# Patient Record
Sex: MALE | Race: ASIAN | NOT HISPANIC OR LATINO | Employment: OTHER | URBAN - METROPOLITAN AREA
[De-identification: names, ages, dates, MRNs, and addresses within clinical notes are randomized per-mention and may not be internally consistent; named-entity substitution may affect disease eponyms.]

---

## 2024-03-22 ENCOUNTER — HOSPITAL ENCOUNTER (INPATIENT)
Facility: HOSPITAL | Age: 64
LOS: 2 days | Discharge: HOME | DRG: 322 | End: 2024-03-24
Attending: STUDENT IN AN ORGANIZED HEALTH CARE EDUCATION/TRAINING PROGRAM | Admitting: STUDENT IN AN ORGANIZED HEALTH CARE EDUCATION/TRAINING PROGRAM

## 2024-03-22 DIAGNOSIS — I21.4 NSTEMI (NON-ST ELEVATED MYOCARDIAL INFARCTION): Primary | ICD-10-CM

## 2024-03-22 DIAGNOSIS — R07.9 CHEST PAIN: ICD-10-CM

## 2024-03-22 DIAGNOSIS — I25.10 CAD (CORONARY ARTERY DISEASE): ICD-10-CM

## 2024-03-22 DIAGNOSIS — R79.89 TROPONIN LEVEL ELEVATED: ICD-10-CM

## 2024-03-22 PROBLEM — F17.200 SMOKING: Status: ACTIVE | Noted: 2024-03-22

## 2024-03-22 LAB
ALBUMIN SERPL BCP-MCNC: 4.2 G/DL (ref 3.5–5.2)
ALP SERPL-CCNC: 62 U/L (ref 55–135)
ALT SERPL W/O P-5'-P-CCNC: 23 U/L (ref 10–44)
ANION GAP SERPL CALC-SCNC: 8 MMOL/L (ref 8–16)
APTT PPP: 27.4 SEC (ref 21–32)
AST SERPL-CCNC: 65 U/L (ref 10–40)
BASOPHILS # BLD AUTO: 0.03 K/UL (ref 0–0.2)
BASOPHILS NFR BLD: 0.4 % (ref 0–1.9)
BILIRUB SERPL-MCNC: 0.6 MG/DL (ref 0.1–1)
BNP SERPL-MCNC: 48 PG/ML (ref 0–99)
BUN SERPL-MCNC: 8 MG/DL (ref 8–23)
CALCIUM SERPL-MCNC: 9.1 MG/DL (ref 8.7–10.5)
CHLORIDE SERPL-SCNC: 108 MMOL/L (ref 95–110)
CO2 SERPL-SCNC: 22 MMOL/L (ref 23–29)
CREAT SERPL-MCNC: 0.9 MG/DL (ref 0.5–1.4)
DIFFERENTIAL METHOD BLD: ABNORMAL
EOSINOPHIL # BLD AUTO: 0.1 K/UL (ref 0–0.5)
EOSINOPHIL NFR BLD: 0.8 % (ref 0–8)
ERYTHROCYTE [DISTWIDTH] IN BLOOD BY AUTOMATED COUNT: 13.1 % (ref 11.5–14.5)
EST. GFR  (NO RACE VARIABLE): >60 ML/MIN/1.73 M^2
GLUCOSE SERPL-MCNC: 114 MG/DL (ref 70–110)
HCT VFR BLD AUTO: 45.5 % (ref 40–54)
HGB BLD-MCNC: 15 G/DL (ref 14–18)
IMM GRANULOCYTES # BLD AUTO: 0.03 K/UL (ref 0–0.04)
IMM GRANULOCYTES NFR BLD AUTO: 0.4 % (ref 0–0.5)
INR PPP: 1 (ref 0.8–1.2)
LYMPHOCYTES # BLD AUTO: 1.5 K/UL (ref 1–4.8)
LYMPHOCYTES NFR BLD: 19.2 % (ref 18–48)
MCH RBC QN AUTO: 30.2 PG (ref 27–31)
MCHC RBC AUTO-ENTMCNC: 33 G/DL (ref 32–36)
MCV RBC AUTO: 92 FL (ref 82–98)
MONOCYTES # BLD AUTO: 0.5 K/UL (ref 0.3–1)
MONOCYTES NFR BLD: 5.9 % (ref 4–15)
NEUTROPHILS # BLD AUTO: 5.8 K/UL (ref 1.8–7.7)
NEUTROPHILS NFR BLD: 73.3 % (ref 38–73)
NRBC BLD-RTO: 0 /100 WBC
PLATELET # BLD AUTO: 208 K/UL (ref 150–450)
PMV BLD AUTO: 9.5 FL (ref 9.2–12.9)
POTASSIUM SERPL-SCNC: 4.1 MMOL/L (ref 3.5–5.1)
PROT SERPL-MCNC: 7.4 G/DL (ref 6–8.4)
PROTHROMBIN TIME: 11 SEC (ref 9–12.5)
RBC # BLD AUTO: 4.96 M/UL (ref 4.6–6.2)
SODIUM SERPL-SCNC: 138 MMOL/L (ref 136–145)
TROPONIN I SERPL DL<=0.01 NG/ML-MCNC: 1.47 NG/ML (ref 0–0.03)
TROPONIN I SERPL DL<=0.01 NG/ML-MCNC: 5.21 NG/ML (ref 0–0.03)
WBC # BLD AUTO: 7.95 K/UL (ref 3.9–12.7)

## 2024-03-22 PROCEDURE — 25000003 PHARM REV CODE 250: Performed by: STUDENT IN AN ORGANIZED HEALTH CARE EDUCATION/TRAINING PROGRAM

## 2024-03-22 PROCEDURE — 11000001 HC ACUTE MED/SURG PRIVATE ROOM

## 2024-03-22 PROCEDURE — 84484 ASSAY OF TROPONIN QUANT: CPT | Mod: 91 | Performed by: STUDENT IN AN ORGANIZED HEALTH CARE EDUCATION/TRAINING PROGRAM

## 2024-03-22 PROCEDURE — 85025 COMPLETE CBC W/AUTO DIFF WBC: CPT | Performed by: NURSE PRACTITIONER

## 2024-03-22 PROCEDURE — 84484 ASSAY OF TROPONIN QUANT: CPT | Performed by: NURSE PRACTITIONER

## 2024-03-22 PROCEDURE — 99285 EMERGENCY DEPT VISIT HI MDM: CPT | Mod: 25

## 2024-03-22 PROCEDURE — 93005 ELECTROCARDIOGRAM TRACING: CPT

## 2024-03-22 PROCEDURE — 36415 COLL VENOUS BLD VENIPUNCTURE: CPT | Performed by: STUDENT IN AN ORGANIZED HEALTH CARE EDUCATION/TRAINING PROGRAM

## 2024-03-22 PROCEDURE — 21400001 HC TELEMETRY ROOM

## 2024-03-22 PROCEDURE — 93010 ELECTROCARDIOGRAM REPORT: CPT | Mod: ,,, | Performed by: INTERNAL MEDICINE

## 2024-03-22 PROCEDURE — 85610 PROTHROMBIN TIME: CPT | Performed by: STUDENT IN AN ORGANIZED HEALTH CARE EDUCATION/TRAINING PROGRAM

## 2024-03-22 PROCEDURE — 85730 THROMBOPLASTIN TIME PARTIAL: CPT | Performed by: STUDENT IN AN ORGANIZED HEALTH CARE EDUCATION/TRAINING PROGRAM

## 2024-03-22 PROCEDURE — 83880 ASSAY OF NATRIURETIC PEPTIDE: CPT | Performed by: NURSE PRACTITIONER

## 2024-03-22 PROCEDURE — 63600175 PHARM REV CODE 636 W HCPCS: Performed by: STUDENT IN AN ORGANIZED HEALTH CARE EDUCATION/TRAINING PROGRAM

## 2024-03-22 PROCEDURE — 80053 COMPREHEN METABOLIC PANEL: CPT | Performed by: NURSE PRACTITIONER

## 2024-03-22 RX ORDER — SODIUM CHLORIDE 0.9 % (FLUSH) 0.9 %
10 SYRINGE (ML) INJECTION EVERY 12 HOURS PRN
Status: DISCONTINUED | OUTPATIENT
Start: 2024-03-22 | End: 2024-03-24 | Stop reason: HOSPADM

## 2024-03-22 RX ORDER — TALC
6 POWDER (GRAM) TOPICAL NIGHTLY PRN
Status: DISCONTINUED | OUTPATIENT
Start: 2024-03-22 | End: 2024-03-24 | Stop reason: HOSPADM

## 2024-03-22 RX ORDER — POLYETHYLENE GLYCOL 3350 17 G/17G
17 POWDER, FOR SOLUTION ORAL DAILY
Status: DISCONTINUED | OUTPATIENT
Start: 2024-03-23 | End: 2024-03-24 | Stop reason: HOSPADM

## 2024-03-22 RX ORDER — NAPROXEN SODIUM 220 MG/1
81 TABLET, FILM COATED ORAL DAILY
Status: DISCONTINUED | OUTPATIENT
Start: 2024-03-23 | End: 2024-03-24 | Stop reason: HOSPADM

## 2024-03-22 RX ORDER — ACETAMINOPHEN 325 MG/1
650 TABLET ORAL EVERY 8 HOURS PRN
Status: DISCONTINUED | OUTPATIENT
Start: 2024-03-22 | End: 2024-03-24 | Stop reason: HOSPADM

## 2024-03-22 RX ORDER — SODIUM,POTASSIUM PHOSPHATES 280-250MG
2 POWDER IN PACKET (EA) ORAL
Status: DISCONTINUED | OUTPATIENT
Start: 2024-03-22 | End: 2024-03-24 | Stop reason: HOSPADM

## 2024-03-22 RX ORDER — IBUPROFEN 200 MG
16 TABLET ORAL
Status: DISCONTINUED | OUTPATIENT
Start: 2024-03-22 | End: 2024-03-24 | Stop reason: HOSPADM

## 2024-03-22 RX ORDER — NALOXONE HCL 0.4 MG/ML
0.02 VIAL (ML) INJECTION
Status: DISCONTINUED | OUTPATIENT
Start: 2024-03-22 | End: 2024-03-24 | Stop reason: HOSPADM

## 2024-03-22 RX ORDER — CLOPIDOGREL BISULFATE 75 MG/1
75 TABLET ORAL DAILY
Status: DISCONTINUED | OUTPATIENT
Start: 2024-03-23 | End: 2024-03-24 | Stop reason: HOSPADM

## 2024-03-22 RX ORDER — ONDANSETRON HYDROCHLORIDE 2 MG/ML
4 INJECTION, SOLUTION INTRAVENOUS EVERY 8 HOURS PRN
Status: DISCONTINUED | OUTPATIENT
Start: 2024-03-22 | End: 2024-03-23 | Stop reason: SDUPTHER

## 2024-03-22 RX ORDER — LANOLIN ALCOHOL/MO/W.PET/CERES
800 CREAM (GRAM) TOPICAL
Status: DISCONTINUED | OUTPATIENT
Start: 2024-03-22 | End: 2024-03-24 | Stop reason: HOSPADM

## 2024-03-22 RX ORDER — GLUCAGON 1 MG
1 KIT INJECTION
Status: DISCONTINUED | OUTPATIENT
Start: 2024-03-22 | End: 2024-03-24 | Stop reason: HOSPADM

## 2024-03-22 RX ORDER — SIMETHICONE 80 MG
1 TABLET,CHEWABLE ORAL 4 TIMES DAILY PRN
Status: DISCONTINUED | OUTPATIENT
Start: 2024-03-22 | End: 2024-03-24 | Stop reason: HOSPADM

## 2024-03-22 RX ORDER — IPRATROPIUM BROMIDE AND ALBUTEROL SULFATE 2.5; .5 MG/3ML; MG/3ML
3 SOLUTION RESPIRATORY (INHALATION) EVERY 4 HOURS PRN
Status: DISCONTINUED | OUTPATIENT
Start: 2024-03-22 | End: 2024-03-24 | Stop reason: HOSPADM

## 2024-03-22 RX ORDER — ASPIRIN 325 MG
325 TABLET ORAL
Status: COMPLETED | OUTPATIENT
Start: 2024-03-22 | End: 2024-03-22

## 2024-03-22 RX ORDER — CLOPIDOGREL BISULFATE 75 MG/1
300 TABLET ORAL ONCE
Status: COMPLETED | OUTPATIENT
Start: 2024-03-22 | End: 2024-03-22

## 2024-03-22 RX ORDER — BISACODYL 10 MG/1
10 SUPPOSITORY RECTAL DAILY PRN
Status: DISCONTINUED | OUTPATIENT
Start: 2024-03-22 | End: 2024-03-24 | Stop reason: HOSPADM

## 2024-03-22 RX ORDER — PROCHLORPERAZINE EDISYLATE 5 MG/ML
5 INJECTION INTRAMUSCULAR; INTRAVENOUS EVERY 6 HOURS PRN
Status: DISCONTINUED | OUTPATIENT
Start: 2024-03-22 | End: 2024-03-24 | Stop reason: HOSPADM

## 2024-03-22 RX ORDER — HEPARIN SODIUM,PORCINE/D5W 25000/250
0-40 INTRAVENOUS SOLUTION INTRAVENOUS CONTINUOUS
Status: DISCONTINUED | OUTPATIENT
Start: 2024-03-22 | End: 2024-03-23

## 2024-03-22 RX ORDER — ATORVASTATIN CALCIUM 40 MG/1
40 TABLET, FILM COATED ORAL NIGHTLY
Status: DISCONTINUED | OUTPATIENT
Start: 2024-03-22 | End: 2024-03-24 | Stop reason: HOSPADM

## 2024-03-22 RX ORDER — ACETAMINOPHEN 325 MG/1
650 TABLET ORAL EVERY 4 HOURS PRN
Status: DISCONTINUED | OUTPATIENT
Start: 2024-03-22 | End: 2024-03-24 | Stop reason: HOSPADM

## 2024-03-22 RX ORDER — IBUPROFEN 200 MG
24 TABLET ORAL
Status: DISCONTINUED | OUTPATIENT
Start: 2024-03-22 | End: 2024-03-24 | Stop reason: HOSPADM

## 2024-03-22 RX ORDER — ALUMINUM HYDROXIDE, MAGNESIUM HYDROXIDE, AND SIMETHICONE 1200; 120; 1200 MG/30ML; MG/30ML; MG/30ML
30 SUSPENSION ORAL 4 TIMES DAILY PRN
Status: DISCONTINUED | OUTPATIENT
Start: 2024-03-22 | End: 2024-03-24 | Stop reason: HOSPADM

## 2024-03-22 RX ADMIN — ASPIRIN 325 MG ORAL TABLET 325 MG: 325 PILL ORAL at 07:03

## 2024-03-22 RX ADMIN — ATORVASTATIN CALCIUM 40 MG: 40 TABLET, FILM COATED ORAL at 10:03

## 2024-03-22 RX ADMIN — HEPARIN SODIUM 12 UNITS/KG/HR: 10000 INJECTION, SOLUTION INTRAVENOUS at 09:03

## 2024-03-22 RX ADMIN — CLOPIDOGREL BISULFATE 300 MG: 75 TABLET ORAL at 10:03

## 2024-03-22 NOTE — Clinical Note
The DP pulses were 2+ bilaterally. The PT pulses were 1+ bilaterally. The right radial pulse was +2.

## 2024-03-22 NOTE — FIRST PROVIDER EVALUATION
"Medical screening examination initiated.  I have conducted a focused provider triage encounter, findings are as follows:    Brief history of present illness:  CP, left sided.    Vitals:    03/22/24 1855   BP: 102/66   Pulse: 63   Resp: 18   Temp: 98.9 °F (37.2 °C)   TempSrc: Oral   SpO2: 98%   Weight: 69.9 kg (154 lb)   Height: 5' 5" (1.651 m)       Pertinent physical exam:  AAO no distress.    Brief workup plan:  EKG, CXR, Labs    Preliminary workup initiated; this workup will be continued and followed by the physician or advanced practice provider that is assigned to the patient when roomed.  "

## 2024-03-22 NOTE — LETTER
March 24, 2024       [address]  [facility] [place] [address]       Patient: Jt Avila   YOB: 1960    To Whom It May Concern:    Leif Avila  was admitted to [facility] on 03/22/2024. The patient may return to work on 03/25/2024 with no restrictions.     Sincerely,    Catina Potts RN

## 2024-03-22 NOTE — Clinical Note
The catheter was inserted into the and was removed from the proximal   circumflex. IVUS Performed (pre-stent)

## 2024-03-22 NOTE — Clinical Note
The catheter was inserted into the and was removed from the proximal   circumflex. IVUS Performed (post-stent)

## 2024-03-22 NOTE — Clinical Note
The catheter was inserted into the and was inserted over the wire into the ostium   left main. An angiography was performed of the left coronary arteries. The angiography was performed via power injection.

## 2024-03-23 LAB
ABO + RH BLD: NORMAL
ANION GAP SERPL CALC-SCNC: 9 MMOL/L (ref 8–16)
APTT PPP: 47.4 SEC (ref 21–32)
APTT PPP: 52.1 SEC (ref 21–32)
ASCENDING AORTA: 3.4 CM
AV INDEX (PROSTH): 0.81
AV MEAN GRADIENT: 3 MMHG
AV PEAK GRADIENT: 5 MMHG
AV VALVE AREA BY VELOCITY RATIO: 2.36 CM²
AV VALVE AREA: 2.53 CM²
AV VELOCITY RATIO: 0.76
BASOPHILS # BLD AUTO: 0.03 K/UL (ref 0–0.2)
BASOPHILS NFR BLD: 0.3 % (ref 0–1.9)
BLD GP AB SCN CELLS X3 SERPL QL: NORMAL
BSA FOR ECHO PROCEDURE: 1.79 M2
BUN SERPL-MCNC: 8 MG/DL (ref 8–23)
CALCIUM SERPL-MCNC: 8.5 MG/DL (ref 8.7–10.5)
CHLORIDE SERPL-SCNC: 108 MMOL/L (ref 95–110)
CHOLEST SERPL-MCNC: 210 MG/DL (ref 120–199)
CHOLEST/HDLC SERPL: 5.4 {RATIO} (ref 2–5)
CO2 SERPL-SCNC: 22 MMOL/L (ref 23–29)
CREAT SERPL-MCNC: 0.8 MG/DL (ref 0.5–1.4)
CV ECHO LV RWT: 0.53 CM
DIFFERENTIAL METHOD BLD: NORMAL
DOP CALC AO PEAK VEL: 1.17 M/S
DOP CALC AO VTI: 24.1 CM
DOP CALC LVOT AREA: 3.1 CM2
DOP CALC LVOT DIAMETER: 1.99 CM
DOP CALC LVOT PEAK VEL: 0.89 M/S
DOP CALC LVOT STROKE VOLUME: 60.93 CM3
DOP CALC MV VTI: 31.2 CM
DOP CALCLVOT PEAK VEL VTI: 19.6 CM
E WAVE DECELERATION TIME: 252.84 MSEC
E/A RATIO: 0.94
E/E' RATIO: 14 M/S
ECHO LV POSTERIOR WALL: 1.16 CM (ref 0.6–1.1)
EOSINOPHIL # BLD AUTO: 0.1 K/UL (ref 0–0.5)
EOSINOPHIL NFR BLD: 1.3 % (ref 0–8)
ERYTHROCYTE [DISTWIDTH] IN BLOOD BY AUTOMATED COUNT: 13 % (ref 11.5–14.5)
EST. GFR  (NO RACE VARIABLE): >60 ML/MIN/1.73 M^2
ESTIMATED AVG GLUCOSE: 108 MG/DL (ref 68–131)
FRACTIONAL SHORTENING: 28 % (ref 28–44)
GLUCOSE SERPL-MCNC: 108 MG/DL (ref 70–110)
HBA1C MFR BLD: 5.4 % (ref 4–5.6)
HCT VFR BLD AUTO: 43.1 % (ref 40–54)
HDLC SERPL-MCNC: 39 MG/DL (ref 40–75)
HDLC SERPL: 18.6 % (ref 20–50)
HGB BLD-MCNC: 14.5 G/DL (ref 14–18)
IMM GRANULOCYTES # BLD AUTO: 0.03 K/UL (ref 0–0.04)
IMM GRANULOCYTES NFR BLD AUTO: 0.3 % (ref 0–0.5)
INR PPP: 1 (ref 0.8–1.2)
INTERVENTRICULAR SEPTUM: 1.41 CM (ref 0.6–1.1)
IVC DIAMETER: 1.45 CM
IVRT: 102.76 MSEC
LA MAJOR: 5.48 CM
LA MINOR: 5.26 CM
LA WIDTH: 3.7 CM
LDLC SERPL CALC-MCNC: 155.4 MG/DL (ref 63–159)
LEFT ATRIUM SIZE: 3.44 CM
LEFT ATRIUM VOLUME INDEX: 33.2 ML/M2
LEFT ATRIUM VOLUME: 58.07 CM3
LEFT INTERNAL DIMENSION IN SYSTOLE: 3.12 CM (ref 2.1–4)
LEFT VENTRICLE DIASTOLIC VOLUME INDEX: 48.75 ML/M2
LEFT VENTRICLE DIASTOLIC VOLUME: 85.32 ML
LEFT VENTRICLE MASS INDEX: 119 G/M2
LEFT VENTRICLE SYSTOLIC VOLUME INDEX: 22.1 ML/M2
LEFT VENTRICLE SYSTOLIC VOLUME: 38.64 ML
LEFT VENTRICULAR INTERNAL DIMENSION IN DIASTOLE: 4.35 CM (ref 3.5–6)
LEFT VENTRICULAR MASS: 207.82 G
LV LATERAL E/E' RATIO: 12.83 M/S
LV SEPTAL E/E' RATIO: 15.4 M/S
LVOT MG: 1.65 MMHG
LVOT MV: 0.6 CM/S
LYMPHOCYTES # BLD AUTO: 2 K/UL (ref 1–4.8)
LYMPHOCYTES NFR BLD: 18.8 % (ref 18–48)
MAGNESIUM SERPL-MCNC: 2.2 MG/DL (ref 1.6–2.6)
MCH RBC QN AUTO: 30.7 PG (ref 27–31)
MCHC RBC AUTO-ENTMCNC: 33.6 G/DL (ref 32–36)
MCV RBC AUTO: 91 FL (ref 82–98)
MONOCYTES # BLD AUTO: 0.8 K/UL (ref 0.3–1)
MONOCYTES NFR BLD: 7.9 % (ref 4–15)
MV MEAN GRADIENT: 1 MMHG
MV PEAK A VEL: 0.82 M/S
MV PEAK E VEL: 0.77 M/S
MV PEAK GRADIENT: 4 MMHG
MV STENOSIS PRESSURE HALF TIME: 73.32 MS
MV VALVE AREA BY CONTINUITY EQUATION: 1.95 CM2
MV VALVE AREA P 1/2 METHOD: 3 CM2
NEUTROPHILS # BLD AUTO: 7.4 K/UL (ref 1.8–7.7)
NEUTROPHILS NFR BLD: 71.4 % (ref 38–73)
NONHDLC SERPL-MCNC: 171 MG/DL
NRBC BLD-RTO: 0 /100 WBC
PHOSPHATE SERPL-MCNC: 2.5 MG/DL (ref 2.7–4.5)
PISA TR MAX VEL: 2.35 M/S
PLATELET # BLD AUTO: 210 K/UL (ref 150–450)
PMV BLD AUTO: 9.4 FL (ref 9.2–12.9)
POC ACTIVATED CLOTTING TIME K: 163 SEC (ref 74–137)
POC ACTIVATED CLOTTING TIME K: 266 SEC (ref 74–137)
POC ACTIVATED CLOTTING TIME K: 266 SEC (ref 74–137)
POCT GLUCOSE: 82 MG/DL (ref 70–110)
POTASSIUM SERPL-SCNC: 3.5 MMOL/L (ref 3.5–5.1)
PROTHROMBIN TIME: 11.1 SEC (ref 9–12.5)
PV PEAK GRADIENT: 3 MMHG
PV PEAK VELOCITY: 0.8 M/S
RA MAJOR: 4.99 CM
RA PRESSURE ESTIMATED: 3 MMHG
RA WIDTH: 3.5 CM
RBC # BLD AUTO: 4.73 M/UL (ref 4.6–6.2)
RIGHT VENTRICULAR END-DIASTOLIC DIMENSION: 3.7 CM
RV TB RVSP: 5 MMHG
RV TISSUE DOPPLER FREE WALL SYSTOLIC VELOCITY 1 (APICAL 4 CHAMBER VIEW): 12.74 CM/S
SAMPLE: ABNORMAL
SINUS: 3.65 CM
SODIUM SERPL-SCNC: 139 MMOL/L (ref 136–145)
SPECIMEN OUTDATE: NORMAL
STJ: 2.56 CM
TDI LATERAL: 0.06 M/S
TDI SEPTAL: 0.05 M/S
TDI: 0.06 M/S
TR MAX PG: 22 MMHG
TRICUSPID ANNULAR PLANE SYSTOLIC EXCURSION: 1.64 CM
TRIGL SERPL-MCNC: 78 MG/DL (ref 30–150)
TROPONIN I SERPL DL<=0.01 NG/ML-MCNC: 32.94 NG/ML (ref 0–0.03)
TV REST PULMONARY ARTERY PRESSURE: 25 MMHG
WBC # BLD AUTO: 10.35 K/UL (ref 3.9–12.7)
Z-SCORE OF LEFT VENTRICULAR DIMENSION IN END DIASTOLE: -1.07
Z-SCORE OF LEFT VENTRICULAR DIMENSION IN END SYSTOLE: 0.32

## 2024-03-23 PROCEDURE — C1874 STENT, COATED/COV W/DEL SYS: HCPCS | Performed by: INTERNAL MEDICINE

## 2024-03-23 PROCEDURE — 83735 ASSAY OF MAGNESIUM: CPT | Performed by: STUDENT IN AN ORGANIZED HEALTH CARE EDUCATION/TRAINING PROGRAM

## 2024-03-23 PROCEDURE — 93010 ELECTROCARDIOGRAM REPORT: CPT | Mod: ,,, | Performed by: INTERNAL MEDICINE

## 2024-03-23 PROCEDURE — C9600 PERC DRUG-EL COR STENT SING: HCPCS | Mod: LC | Performed by: INTERNAL MEDICINE

## 2024-03-23 PROCEDURE — 99291 CRITICAL CARE FIRST HOUR: CPT | Mod: 25,,, | Performed by: INTERNAL MEDICINE

## 2024-03-23 PROCEDURE — 25000003 PHARM REV CODE 250: Performed by: STUDENT IN AN ORGANIZED HEALTH CARE EDUCATION/TRAINING PROGRAM

## 2024-03-23 PROCEDURE — 83036 HEMOGLOBIN GLYCOSYLATED A1C: CPT | Performed by: STUDENT IN AN ORGANIZED HEALTH CARE EDUCATION/TRAINING PROGRAM

## 2024-03-23 PROCEDURE — 84484 ASSAY OF TROPONIN QUANT: CPT | Performed by: STUDENT IN AN ORGANIZED HEALTH CARE EDUCATION/TRAINING PROGRAM

## 2024-03-23 PROCEDURE — 92928 PRQ TCAT PLMT NTRAC ST 1 LES: CPT | Mod: LC,,, | Performed by: INTERNAL MEDICINE

## 2024-03-23 PROCEDURE — 80061 LIPID PANEL: CPT | Performed by: STUDENT IN AN ORGANIZED HEALTH CARE EDUCATION/TRAINING PROGRAM

## 2024-03-23 PROCEDURE — 85610 PROTHROMBIN TIME: CPT | Performed by: STUDENT IN AN ORGANIZED HEALTH CARE EDUCATION/TRAINING PROGRAM

## 2024-03-23 PROCEDURE — 85730 THROMBOPLASTIN TIME PARTIAL: CPT | Mod: 91 | Performed by: HOSPITALIST

## 2024-03-23 PROCEDURE — 93458 L HRT ARTERY/VENTRICLE ANGIO: CPT | Performed by: INTERNAL MEDICINE

## 2024-03-23 PROCEDURE — 027034Z DILATION OF CORONARY ARTERY, ONE ARTERY WITH DRUG-ELUTING INTRALUMINAL DEVICE, PERCUTANEOUS APPROACH: ICD-10-PCS | Performed by: INTERNAL MEDICINE

## 2024-03-23 PROCEDURE — 63600175 PHARM REV CODE 636 W HCPCS: Mod: JZ,JG | Performed by: INTERNAL MEDICINE

## 2024-03-23 PROCEDURE — 84100 ASSAY OF PHOSPHORUS: CPT | Performed by: STUDENT IN AN ORGANIZED HEALTH CARE EDUCATION/TRAINING PROGRAM

## 2024-03-23 PROCEDURE — 99152 MOD SED SAME PHYS/QHP 5/>YRS: CPT | Mod: ,,, | Performed by: INTERNAL MEDICINE

## 2024-03-23 PROCEDURE — 93458 L HRT ARTERY/VENTRICLE ANGIO: CPT | Mod: 26,51,59, | Performed by: INTERNAL MEDICINE

## 2024-03-23 PROCEDURE — C1769 GUIDE WIRE: HCPCS | Performed by: INTERNAL MEDICINE

## 2024-03-23 PROCEDURE — B241ZZ3 ULTRASONOGRAPHY OF MULTIPLE CORONARY ARTERIES, INTRAVASCULAR: ICD-10-PCS | Performed by: INTERNAL MEDICINE

## 2024-03-23 PROCEDURE — 36415 COLL VENOUS BLD VENIPUNCTURE: CPT | Performed by: HOSPITALIST

## 2024-03-23 PROCEDURE — 99153 MOD SED SAME PHYS/QHP EA: CPT | Performed by: INTERNAL MEDICINE

## 2024-03-23 PROCEDURE — 92978 ENDOLUMINL IVUS OCT C 1ST: CPT | Mod: 26,LC,, | Performed by: INTERNAL MEDICINE

## 2024-03-23 PROCEDURE — 4A023N7 MEASUREMENT OF CARDIAC SAMPLING AND PRESSURE, LEFT HEART, PERCUTANEOUS APPROACH: ICD-10-PCS | Performed by: INTERNAL MEDICINE

## 2024-03-23 PROCEDURE — 80048 BASIC METABOLIC PNL TOTAL CA: CPT | Performed by: STUDENT IN AN ORGANIZED HEALTH CARE EDUCATION/TRAINING PROGRAM

## 2024-03-23 PROCEDURE — 85025 COMPLETE CBC W/AUTO DIFF WBC: CPT | Performed by: STUDENT IN AN ORGANIZED HEALTH CARE EDUCATION/TRAINING PROGRAM

## 2024-03-23 PROCEDURE — 27201423 OPTIME MED/SURG SUP & DEVICES STERILE SUPPLY: Performed by: INTERNAL MEDICINE

## 2024-03-23 PROCEDURE — C1887 CATHETER, GUIDING: HCPCS | Performed by: INTERNAL MEDICINE

## 2024-03-23 PROCEDURE — B2111ZZ FLUOROSCOPY OF MULTIPLE CORONARY ARTERIES USING LOW OSMOLAR CONTRAST: ICD-10-PCS | Performed by: INTERNAL MEDICINE

## 2024-03-23 PROCEDURE — 25000242 PHARM REV CODE 250 ALT 637 W/ HCPCS: Performed by: STUDENT IN AN ORGANIZED HEALTH CARE EDUCATION/TRAINING PROGRAM

## 2024-03-23 PROCEDURE — 25000003 PHARM REV CODE 250: Performed by: INTERNAL MEDICINE

## 2024-03-23 PROCEDURE — 99152 MOD SED SAME PHYS/QHP 5/>YRS: CPT | Performed by: INTERNAL MEDICINE

## 2024-03-23 PROCEDURE — B2151ZZ FLUOROSCOPY OF LEFT HEART USING LOW OSMOLAR CONTRAST: ICD-10-PCS | Performed by: INTERNAL MEDICINE

## 2024-03-23 PROCEDURE — C1894 INTRO/SHEATH, NON-LASER: HCPCS | Performed by: INTERNAL MEDICINE

## 2024-03-23 PROCEDURE — 11000001 HC ACUTE MED/SURG PRIVATE ROOM

## 2024-03-23 PROCEDURE — C1725 CATH, TRANSLUMIN NON-LASER: HCPCS | Performed by: INTERNAL MEDICINE

## 2024-03-23 PROCEDURE — 92978 ENDOLUMINL IVUS OCT C 1ST: CPT | Mod: LC | Performed by: INTERNAL MEDICINE

## 2024-03-23 PROCEDURE — 25500020 PHARM REV CODE 255: Performed by: INTERNAL MEDICINE

## 2024-03-23 PROCEDURE — 93005 ELECTROCARDIOGRAM TRACING: CPT

## 2024-03-23 PROCEDURE — C1753 CATH, INTRAVAS ULTRASOUND: HCPCS | Performed by: INTERNAL MEDICINE

## 2024-03-23 PROCEDURE — 86850 RBC ANTIBODY SCREEN: CPT | Performed by: STUDENT IN AN ORGANIZED HEALTH CARE EDUCATION/TRAINING PROGRAM

## 2024-03-23 DEVICE — EVEROLIMUS-ELUTING PLATINUM CHROMIUM CORONARY STENT SYSTEM
Type: IMPLANTABLE DEVICE | Site: CORONARY | Status: FUNCTIONAL
Brand: SYNERGY™ XD

## 2024-03-23 RX ORDER — MIDAZOLAM HYDROCHLORIDE 1 MG/ML
INJECTION, SOLUTION INTRAMUSCULAR; INTRAVENOUS
Status: DISCONTINUED | OUTPATIENT
Start: 2024-03-23 | End: 2024-03-23 | Stop reason: HOSPADM

## 2024-03-23 RX ORDER — SODIUM CHLORIDE 9 MG/ML
INJECTION, SOLUTION INTRAVENOUS CONTINUOUS
Status: DISCONTINUED | OUTPATIENT
Start: 2024-03-23 | End: 2024-03-24

## 2024-03-23 RX ORDER — OXYCODONE HYDROCHLORIDE 5 MG/1
5 TABLET ORAL EVERY 4 HOURS PRN
Status: DISCONTINUED | OUTPATIENT
Start: 2024-03-23 | End: 2024-03-24 | Stop reason: HOSPADM

## 2024-03-23 RX ORDER — ONDANSETRON HYDROCHLORIDE 2 MG/ML
4 INJECTION, SOLUTION INTRAVENOUS EVERY 12 HOURS PRN
Status: DISCONTINUED | OUTPATIENT
Start: 2024-03-23 | End: 2024-03-24 | Stop reason: HOSPADM

## 2024-03-23 RX ORDER — LIDOCAINE HYDROCHLORIDE 10 MG/ML
INJECTION, SOLUTION EPIDURAL; INFILTRATION; INTRACAUDAL; PERINEURAL
Status: DISCONTINUED | OUTPATIENT
Start: 2024-03-23 | End: 2024-03-23 | Stop reason: HOSPADM

## 2024-03-23 RX ORDER — VERAPAMIL HYDROCHLORIDE 2.5 MG/ML
INJECTION, SOLUTION INTRAVENOUS
Status: DISCONTINUED | OUTPATIENT
Start: 2024-03-23 | End: 2024-03-23 | Stop reason: HOSPADM

## 2024-03-23 RX ORDER — PHENYLEPHRINE HCL IN 0.9% NACL 1 MG/10 ML
SYRINGE (ML) INTRAVENOUS
Status: DISCONTINUED | OUTPATIENT
Start: 2024-03-23 | End: 2024-03-23 | Stop reason: HOSPADM

## 2024-03-23 RX ORDER — HEPARIN SODIUM 200 [USP'U]/100ML
INJECTION, SOLUTION INTRAVENOUS
Status: DISCONTINUED | OUTPATIENT
Start: 2024-03-23 | End: 2024-03-24 | Stop reason: HOSPADM

## 2024-03-23 RX ORDER — ACETAMINOPHEN 325 MG/1
650 TABLET ORAL EVERY 4 HOURS PRN
Status: DISCONTINUED | OUTPATIENT
Start: 2024-03-23 | End: 2024-03-23 | Stop reason: SDUPTHER

## 2024-03-23 RX ORDER — HEPARIN SODIUM 1000 [USP'U]/ML
INJECTION, SOLUTION INTRAVENOUS; SUBCUTANEOUS
Status: DISCONTINUED | OUTPATIENT
Start: 2024-03-23 | End: 2024-03-23 | Stop reason: HOSPADM

## 2024-03-23 RX ORDER — MORPHINE SULFATE 4 MG/ML
3 INJECTION, SOLUTION INTRAMUSCULAR; INTRAVENOUS
Status: DISCONTINUED | OUTPATIENT
Start: 2024-03-23 | End: 2024-03-24 | Stop reason: HOSPADM

## 2024-03-23 RX ORDER — NITROGLYCERIN 0.4 MG/1
0.4 TABLET SUBLINGUAL EVERY 5 MIN PRN
Status: DISCONTINUED | OUTPATIENT
Start: 2024-03-23 | End: 2024-03-24 | Stop reason: HOSPADM

## 2024-03-23 RX ORDER — FENTANYL CITRATE 50 UG/ML
INJECTION, SOLUTION INTRAMUSCULAR; INTRAVENOUS
Status: DISCONTINUED | OUTPATIENT
Start: 2024-03-23 | End: 2024-03-23 | Stop reason: HOSPADM

## 2024-03-23 RX ORDER — NITROGLYCERIN 20 MG/100ML
INJECTION INTRAVENOUS
Status: DISCONTINUED | OUTPATIENT
Start: 2024-03-23 | End: 2024-03-23 | Stop reason: HOSPADM

## 2024-03-23 RX ORDER — DIPHENHYDRAMINE HCL 25 MG
50 CAPSULE ORAL ONCE
Status: COMPLETED | OUTPATIENT
Start: 2024-03-23 | End: 2024-03-23

## 2024-03-23 RX ADMIN — ASPIRIN 81 MG CHEWABLE TABLET 81 MG: 81 TABLET CHEWABLE at 10:03

## 2024-03-23 RX ADMIN — CLOPIDOGREL BISULFATE 75 MG: 75 TABLET ORAL at 10:03

## 2024-03-23 RX ADMIN — SODIUM CHLORIDE: 9 INJECTION, SOLUTION INTRAVENOUS at 08:03

## 2024-03-23 RX ADMIN — ATORVASTATIN CALCIUM 40 MG: 40 TABLET, FILM COATED ORAL at 09:03

## 2024-03-23 RX ADMIN — SODIUM CHLORIDE: 9 INJECTION, SOLUTION INTRAVENOUS at 01:03

## 2024-03-23 RX ADMIN — DIPHENHYDRAMINE HYDROCHLORIDE 50 MG: 25 CAPSULE ORAL at 01:03

## 2024-03-23 RX ADMIN — NITROGLYCERIN 0.4 MG: 0.4 TABLET, ORALLY DISINTEGRATING SUBLINGUAL at 12:03

## 2024-03-23 NOTE — ASSESSMENT & PLAN NOTE
Concern for NSTEMI  Continue aspirin, Plavix, statin and heparin drip   Cardiology consult   Echocardiogram   NPO after MN

## 2024-03-23 NOTE — NURSING
" notified of patient's trop bumped to 5.2.2, patient describes chest pain as "slight". Nitro given.  "

## 2024-03-23 NOTE — SUBJECTIVE & OBJECTIVE
History reviewed. No pertinent past medical history.    History reviewed. No pertinent surgical history.    Review of patient's allergies indicates:  No Known Allergies    No current facility-administered medications on file prior to encounter.     No current outpatient medications on file prior to encounter.     Family History    None       Tobacco Use    Smoking status: Not on file    Smokeless tobacco: Not on file   Substance and Sexual Activity    Alcohol use: Not on file    Drug use: Not on file    Sexual activity: Not on file     Review of Systems   Constitutional: Negative.   HENT: Negative.     Eyes: Negative.    Cardiovascular:  Positive for chest pain.   Respiratory: Negative.     Endocrine: Negative.    Hematologic/Lymphatic: Negative.    Skin: Negative.    Musculoskeletal: Negative.    Gastrointestinal: Negative.    Genitourinary: Negative.    Neurological: Negative.    Psychiatric/Behavioral: Negative.     Allergic/Immunologic: Negative.      Objective:     Vital Signs (Most Recent):  Temp: 98.2 °F (36.8 °C) (03/23/24 1225)  Pulse: 76 (03/23/24 1225)  Resp: 16 (03/23/24 1225)  BP: 94/61 (03/23/24 1225)  SpO2: 96 % (03/23/24 1225) Vital Signs (24h Range):  Temp:  [98.2 °F (36.8 °C)-98.9 °F (37.2 °C)] 98.2 °F (36.8 °C)  Pulse:  [59-76] 76  Resp:  [16-26] 16  SpO2:  [96 %-100 %] 96 %  BP: ()/(60-80) 94/61     Weight: 68.1 kg (150 lb 2.1 oz)  Body mass index is 24.98 kg/m².    SpO2: 96 %       No intake or output data in the 24 hours ending 03/23/24 1323    Lines/Drains/Airways       Peripheral Intravenous Line  Duration                  Peripheral IV - Single Lumen 03/22/24 2004 20 G Right Antecubital <1 day         Peripheral IV - Single Lumen 03/22/24 2135 20 G Posterior;Right Hand <1 day                     Physical Exam  Vitals reviewed.   Constitutional:       Appearance: He is well-developed.   HENT:      Head: Normocephalic.   Eyes:      Conjunctiva/sclera: Conjunctivae normal.      Pupils:  "Pupils are equal, round, and reactive to light.   Cardiovascular:      Rate and Rhythm: Normal rate and regular rhythm.      Heart sounds: Normal heart sounds.   Pulmonary:      Effort: Pulmonary effort is normal.      Breath sounds: Normal breath sounds.   Abdominal:      General: Bowel sounds are normal.      Palpations: Abdomen is soft.   Musculoskeletal:      Cervical back: Normal range of motion and neck supple.   Skin:     General: Skin is warm.   Neurological:      Mental Status: He is alert and oriented to person, place, and time.          Significant Labs:     DATA:     Laboratory:  CBC:  Recent Labs   Lab 03/22/24 1941 03/23/24  0345   WBC 7.95 10.35   Hemoglobin 15.0 14.5   Hematocrit 45.5 43.1   Platelets 208 210       CHEMISTRIES:  Recent Labs   Lab 03/22/24 1941 03/23/24  0345   Glucose 114 H 108   Sodium 138 139   Potassium 4.1 3.5   BUN 8 8   Creatinine 0.9 0.8   Calcium 9.1 8.5 L   Magnesium  --  2.2       CARDIAC BIOMARKERS:  Recent Labs   Lab 03/22/24 1941 03/22/24  2314 03/23/24  0345   Troponin I 1.471 H 5.212 H 32.935 H       COAGS:  Recent Labs   Lab 03/22/24 2054 03/23/24  0345   INR 1.0 1.0       LIPIDS/LFTS:  Recent Labs   Lab 03/22/24 1941 03/23/24  0345   Cholesterol  --  210 H   Triglycerides  --  78   HDL  --  39 L   LDL Cholesterol  --  155.4   Non-HDL Cholesterol  --  171   AST 65 H  --    ALT 23  --        No results found for: "HGBA1C"    TSH        The ASCVD Risk score (Kaerem DK, et al., 2019) failed to calculate for the following reasons:    The patient has a prior MI or stroke diagnosis       BNP    Lab Results   Component Value Date/Time    BNP 48 03/22/2024 07:41 PM            ECHO    Results for orders placed during the hospital encounter of 03/22/24    Echo    Interpretation Summary    Left Ventricle: The left ventricle is normal in size. Mildly increased wall thickness. Septal thickening. There is concentric hypertrophy. There is normal systolic function with a " visually estimated ejection fraction of 55 - 60%. Grade I diastolic dysfunction.    Right Ventricle: Normal right ventricular cavity size. Systolic function is normal.    Left Atrium: Left atrium is moderately dilated.    Right Atrium: Right atrium is mildly dilated.    Mitral Valve: There is mild regurgitation.    Tricuspid Valve: There is mild regurgitation.    Pulmonary Artery: The estimated pulmonary artery systolic pressure is 25 mmHg.    IVC/SVC: Normal venous pressure at 3 mmHg.      STRESS TEST    No results found for this or any previous visit.        CATH    No results found for this or any previous visit.

## 2024-03-23 NOTE — ASSESSMENT & PLAN NOTE
Non-STEMI.  Aspirin Plavix heparin drip.  Cardiac catheterization    Risks, benefits and alternatives of the catheterization procedure were discussed with the patient.The risks of coronary angiography include but are not limited to: bleeding, infection, death, heart attack, arrhythmia, kidney injury or failure, potential need for dialysis, allergic reactions, stroke, need for emergency surgery, hematoma, pseudoaneurysm etc.  Should stenting be indicated, the patient has agreed to dual anti-platelet therapy for 1-consecutive year with a drug-eluting stent and a minimum of 1-month with the use of a bare metal stent. Additionally, pt is aware that non-compliance is likely to result in stent clotting with heart attack, heart failure, and/or death  The risks of moderate sedation include hypotension, respiratory depression, arrhythmias, bronchospasm, and death. Informed consent was obtained and the  patient is agreeable to proceed with the procedure. Consent was placed on the chart.

## 2024-03-23 NOTE — PLAN OF CARE
Problem: Adult Inpatient Plan of Care  Goal: Plan of Care Review  3/23/2024 1110 by Augusta Corey RN  Outcome: Ongoing, Progressing  Flowsheets (Taken 3/23/2024 1110)  Plan of Care Reviewed With:   patient   spouse  3/23/2024 1107 by Augusta Corey RN  Outcome: Ongoing, Progressing  Flowsheets (Taken 3/23/2024 1107)  Plan of Care Reviewed With:   patient   spouse  Goal: Absence of Hospital-Acquired Illness or Injury  3/23/2024 1110 by Augusta Corey RN  Outcome: Ongoing, Progressing  3/23/2024 1107 by Augusta Corey RN  Outcome: Ongoing, Progressing  Intervention: Identify and Manage Fall Risk  3/23/2024 1110 by Augusta Corey RN  Flowsheets (Taken 3/23/2024 1110)  Safety Promotion/Fall Prevention:   assistive device/personal item within reach   side rails raised x 2   nonskid shoes/socks when out of bed   room near unit station  3/23/2024 1107 by Augusta Corey RN  Flowsheets (Taken 3/23/2024 1107)  Safety Promotion/Fall Prevention:   assistive device/personal item within reach   side rails raised x 2   room near unit station  Intervention: Prevent Skin Injury  3/23/2024 1110 by Augusta Corey RN  Flowsheets (Taken 3/23/2024 1110)  Body Position: position changed independently  Skin Protection: adhesive use limited  3/23/2024 1107 by Augusta Corey RN  Flowsheets (Taken 3/23/2024 1107)  Body Position: position changed independently  Intervention: Prevent and Manage VTE (Venous Thromboembolism) Risk  Flowsheets (Taken 3/23/2024 1107)  Activity Management: Arm raise - L1  Range of Motion: active ROM (range of motion) encouraged  Intervention: Prevent Infection  Flowsheets (Taken 3/23/2024 1110)  Infection Prevention:   single patient room provided   hand hygiene promoted   rest/sleep promoted  Goal: Optimal Comfort and Wellbeing  Outcome: Ongoing, Progressing  Intervention: Monitor Pain and Promote Comfort  3/23/2024 1110 by Augusta Corey RN  Flowsheets (Taken 3/23/2024 1110)  Pain Management  Interventions:   care clustered   quiet environment facilitated   relaxation techniques promoted  3/23/2024 1107 by Augusta Corey, RN  Flowsheets (Taken 3/23/2024 1107)  Pain Management Interventions:   care clustered   relaxation techniques promoted   quiet environment facilitated  Intervention: Provide Person-Centered Care  3/23/2024 1110 by Augusta Corey, RN  Flowsheets (Taken 3/23/2024 1110)  Trust Relationship/Rapport:   care explained   choices provided   emotional support provided   empathic listening provided   questions answered   questions encouraged   reassurance provided   thoughts/feelings acknowledged  3/23/2024 1107 by Augusta Corey, RN  Flowsheets (Taken 3/23/2024 1107)  Trust Relationship/Rapport:   care explained   choices provided   emotional support provided   empathic listening provided   questions answered   questions encouraged   reassurance provided   thoughts/feelings acknowledged

## 2024-03-23 NOTE — PROGRESS NOTES
[facility] Medicine  Progress Note    Patient Name: Jt Avila  MRN: [id number]  Patient Class: IP- Inpatient   Admission Date: 3/22/2024  Length of Stay: 1 days  Attending Physician: Hugh Agosto MD  Primary Care Provider: Unable, To Obtain        Subjective:     Principal Problem:NSTEMI (non-ST elevated myocardial infarction)        HPI:  This is a 64-year-old male with no significant past medical history who presents with chest pain.      Patient presents with chest pain that started on the day of presentation.  He describes a pressure-like pain with radiation to his arms.  He reports that his arms felt numb/heavy which is now resolved.  Associated symptoms include nausea.  He smokes about a pack per day, for close to 50 years.     In the ED, the patient was hemodynamically stable.  Labs were remarkable for an elevated troponin (1.471).  EKG showed T-wave flattening in some inferior leads.  Patient was given aspirin 325 mg, and was started on heparin drip.  He was admitted for further management.    Overview/Hospital Course:  Admitted with NSTEMI and trop of 1.4 that exponentially went up overnight to 32.9 on ACL meds. ECHO and repeat EKG ordered. Cardiology consulted, assume [facility] today, renal fxn good.     Interval History:  NAEON.  CC is CP  NPO for [facility]  All questions answered and updates on care given.       ROS:  General: Negative for fevers or chills.  Pulmonary: Negative for dyspnea or wheezing.  GI: Negative for abdominal distention or pain     Vitals:    03/22/24 2309 03/23/24 0311 03/23/24 0427 03/23/24 0452   BP:   111/61 101/60   BP Location:   Left arm Left arm   Patient Position:   Lying Lying   Pulse: 61 61 61 64   Resp:   18 17   Temp:   98.8 °F (37.1 °C) 98.4 °F (36.9 °C)   TempSrc:   Oral Oral   SpO2:   96% 98%   Weight:       Height:              Body mass index is 24.98 kg/m².      PHYSICAL EXAM:  GENERAL APPEARANCE: alert and cooperative  HEENT:     HEAD: NC/AT  CARDIAC: There  is no peripheral edema, cyanosis or pallor.   LUNGS: Clear to auscultation and percussion without rales or wheezing  ABDOMEN: Non-distended. No guarding.  MSK: No joint erythema or tenderness.   EXTREMITIES: No significant deformity or joint abnormality. No edema.   NEUROLOGICAL: CN II-XII grossly intact.   SKIN: Skin normal color, texture and turgor with no lesions or eruptions.  PSYCHIATRIC: Oriented. No tangential speech. No Hyperactive features.        Recent Results (from the past 24 hour(s))   CBC auto differential    Collection Time: 03/22/24  7:41 PM   Result Value Ref Range    WBC 7.95 3.90 - 12.70 K/uL    RBC 4.96 4.60 - 6.20 M/uL    Hemoglobin 15.0 14.0 - 18.0 g/dL    Hematocrit 45.5 40.0 - 54.0 %    MCV 92 82 - 98 fL    MCH 30.2 27.0 - 31.0 pg    MCHC 33.0 32.0 - 36.0 g/dL    RDW 13.1 11.5 - 14.5 %    Platelets 208 150 - 450 K/uL    MPV 9.5 9.2 - 12.9 fL    Immature Granulocytes 0.4 0.0 - 0.5 %    Gran # (ANC) 5.8 1.8 - 7.7 K/uL    Immature Grans (Abs) 0.03 0.00 - 0.04 K/uL    Lymph # 1.5 1.0 - 4.8 K/uL    Mono # 0.5 0.3 - 1.0 K/uL    Eos # 0.1 0.0 - 0.5 K/uL    Baso # 0.03 0.00 - 0.20 K/uL    nRBC 0 0 /100 WBC    Gran % 73.3 (H) 38.0 - 73.0 %    Lymph % 19.2 18.0 - 48.0 %    Mono % 5.9 4.0 - 15.0 %    Eosinophil % 0.8 0.0 - 8.0 %    Basophil % 0.4 0.0 - 1.9 %    Differential Method Automated    Comprehensive metabolic panel    Collection Time: 03/22/24  7:41 PM   Result Value Ref Range    Sodium 138 136 - 145 mmol/L    Potassium 4.1 3.5 - 5.1 mmol/L    Chloride 108 95 - 110 mmol/L    CO2 22 (L) 23 - 29 mmol/L    Glucose 114 (H) 70 - 110 mg/dL    BUN 8 8 - 23 mg/dL    Creatinine 0.9 0.5 - 1.4 mg/dL    Calcium 9.1 8.7 - 10.5 mg/dL    Total Protein 7.4 6.0 - 8.4 g/dL    Albumin 4.2 3.5 - 5.2 g/dL    Total Bilirubin 0.6 0.1 - 1.0 mg/dL    Alkaline Phosphatase 62 55 - 135 U/L    AST 65 (H) 10 - 40 U/L    ALT 23 10 - 44 U/L    eGFR >60 >60 mL/min/1.73 m^2    Anion Gap 8 8 - 16 mmol/L   Troponin I #1     Collection Time: 03/22/24  7:41 PM   Result Value Ref Range    Troponin I 1.471 (H) 0.000 - 0.026 ng/mL   B-Type natriuretic peptide (BNP)    Collection Time: 03/22/24  7:41 PM   Result Value Ref Range    BNP 48 0 - 99 pg/mL   APTT    Collection Time: 03/22/24  8:54 PM   Result Value Ref Range    aPTT 27.4 21.0 - 32.0 sec   Protime-INR    Collection Time: 03/22/24  8:54 PM   Result Value Ref Range    Prothrombin Time 11.0 9.0 - 12.5 sec    INR 1.0 0.8 - 1.2   Troponin I #2    Collection Time: 03/22/24 11:14 PM   Result Value Ref Range    Troponin I 5.212 (H) 0.000 - 0.026 ng/mL   APTT    Collection Time: 03/23/24  3:44 AM   Result Value Ref Range    aPTT 52.1 (H) 21.0 - 32.0 sec   CBC auto differential    Collection Time: 03/23/24  3:45 AM   Result Value Ref Range    WBC 10.35 3.90 - 12.70 K/uL    RBC 4.73 4.60 - 6.20 M/uL    Hemoglobin 14.5 14.0 - 18.0 g/dL    Hematocrit 43.1 40.0 - 54.0 %    MCV 91 82 - 98 fL    MCH 30.7 27.0 - 31.0 pg    MCHC 33.6 32.0 - 36.0 g/dL    RDW 13.0 11.5 - 14.5 %    Platelets 210 150 - 450 K/uL    MPV 9.4 9.2 - 12.9 fL    Immature Granulocytes 0.3 0.0 - 0.5 %    Gran # (ANC) 7.4 1.8 - 7.7 K/uL    Immature Grans (Abs) 0.03 0.00 - 0.04 K/uL    Lymph # 2.0 1.0 - 4.8 K/uL    Mono # 0.8 0.3 - 1.0 K/uL    Eos # 0.1 0.0 - 0.5 K/uL    Baso # 0.03 0.00 - 0.20 K/uL    nRBC 0 0 /100 WBC    Gran % 71.4 38.0 - 73.0 %    Lymph % 18.8 18.0 - 48.0 %    Mono % 7.9 4.0 - 15.0 %    Eosinophil % 1.3 0.0 - 8.0 %    Basophil % 0.3 0.0 - 1.9 %    Differential Method Automated    Lipid Panel    Collection Time: 03/23/24  3:45 AM   Result Value Ref Range    Cholesterol 210 (H) 120 - 199 mg/dL    Triglycerides 78 30 - 150 mg/dL    HDL 39 (L) 40 - 75 mg/dL    LDL Cholesterol 155.4 63.0 - 159.0 mg/dL    HDL/Cholesterol Ratio 18.6 (L) 20.0 - 50.0 %    Total Cholesterol/HDL Ratio 5.4 (H) 2.0 - 5.0    Non-HDL Cholesterol 171 mg/dL   Protime-INR    Collection Time: 03/23/24  3:45 AM   Result Value Ref Range     Prothrombin Time 11.1 9.0 - 12.5 sec    INR 1.0 0.8 - 1.2   Type & Screen    Collection Time: 03/23/24  3:45 AM   Result Value Ref Range    Group & Rh A POS     Indirect [place] NEG     Specimen Outdate 03/26/2024 23:59    Phosphorus    Collection Time: 03/23/24  3:45 AM   Result Value Ref Range    Phosphorus 2.5 (L) 2.7 - 4.5 mg/dL   Magnesium    Collection Time: 03/23/24  3:45 AM   Result Value Ref Range    Magnesium 2.2 1.6 - 2.6 mg/dL   Basic Metabolic Panel    Collection Time: 03/23/24  3:45 AM   Result Value Ref Range    Sodium 139 136 - 145 mmol/L    Potassium 3.5 3.5 - 5.1 mmol/L    Chloride 108 95 - 110 mmol/L    CO2 22 (L) 23 - 29 mmol/L    Glucose 108 70 - 110 mg/dL    BUN 8 8 - 23 mg/dL    Creatinine 0.8 0.5 - 1.4 mg/dL    Calcium 8.5 (L) 8.7 - 10.5 mg/dL    Anion Gap 9 8 - 16 mmol/L    eGFR >60 >60 mL/min/1.73 m^2   Troponin I    Collection Time: 03/23/24  3:45 AM   Result Value Ref Range    Troponin I 32.935 (H) 0.000 - 0.026 ng/mL       Microbiology Results (last 7 days)       ** No results found for the last 168 hours. **             Imaging Results              X-Ray Chest PA And Lateral (Final result)  Result time 03/22/24 19:11:38      Final result by Tate Rowell MD (03/22/24 19:11:38)                   Impression:      No acute cardiopulmonary process identified.      Electronically signed by: Tate Rowell MD  Date:    03/22/2024  Time:    19:11               Narrative:    EXAMINATION:  XR CHEST PA AND LATERAL    CLINICAL HISTORY:  Chest pain, unspecified    TECHNIQUE:  PA and lateral views of the chest were performed.    COMPARISON:  None    FINDINGS:  Cardiac silhouette is normal in size.  Lungs are symmetrically expanded.  Minimal scarring is seen at the left lung base.  No evidence of focal consolidative process, pneumothorax, or significant pleural effusion.  No acute osseous abnormality identified.                                             Assessment/Plan:      * NSTEMI (non-ST  elevated myocardial infarction)  Concern for NSTEMI  Continue aspirin, Plavix, statin and heparin drip   Cardiology consult   Echocardiogram   NPO after MN    Smoking  I spent a total of 4 minutes counseling the patient on smoking cessation.           VTE Risk Mitigation (From admission, onward)           Ordered     IP VTE LOW RISK PATIENT  Once         03/22/24 2159     Place sequential compression device  Until discontinued         03/22/24 2159     heparin 25,000 units in dextrose 5% (100 units/ml) IV bolus from bag LOW INTENSITY nomogram - [facility]  As needed (PRN)        Question:  Heparin Infusion Adjustment (DO NOT MODIFY ANSWER)  Answer:  \\[email or web address]\Images\[email or web address]\HeparinInfusions\heparin LOW INTENSITY nomogram for [facility] [id number]    03/22/24 2045     heparin 25,000 units in dextrose 5% (100 units/ml) IV bolus from bag LOW INTENSITY nomogram - [facility]  As needed (PRN)        Question:  Heparin Infusion Adjustment (DO NOT MODIFY ANSWER)  Answer:  [email or web address]\Images\Pharmacy\HeparinInfusions\heparin LOW INTENSITY nomogram for [facility] [id number]    03/22/24 2045     heparin 25,000 units in dextrose 5% 250 mL (100 units/mL) infusion LOW INTENSITY nomogram - [facility]  Continuous        Question:  Begin at (units/kg/hr)  Answer:  12 03/22/24 2045                    Discharge Planning   HEATHER:      Code Status: Full Code   Is the patient medically ready for discharge?:     Reason for patient still in hospital (select all that apply): Patient trending condition and Treatment                     Hugh Agosto MD  Department of Hospital Medicine   [facility]

## 2024-03-23 NOTE — H&P
[facility] Medicine  History & Physical    Patient Name: Jt Avila  MRN: [id number]  Patient Class: IP- Inpatient  Admission Date: 3/22/2024  Attending Physician: Tommy Shah MD   Primary Care Provider: Unable, To Obtain         Patient information was obtained from patient, spouse/SO, and ER records.     Subjective:     Principal Problem:NSTEMI (non-ST elevated myocardial infarction)    Chief Complaint:   Chief Complaint   Patient presents with    Chest Pain     Pt c/o left sided non radiating CP starting today. Denies any SOB         HPI: This is a 64-year-old male with no significant past medical history who presents with chest pain.      Patient presents with chest pain that started on the day of presentation.  He describes a pressure-like pain with radiation to his arms.  He reports that his arms felt numb/heavy which is now resolved.  Associated symptoms include nausea.  He smokes about a pack per day, for close to 50 years.     In the ED, the patient was hemodynamically stable.  Labs were remarkable for an elevated troponin (1.471).  EKG showed T-wave flattening in some inferior leads.  Patient was given aspirin 325 mg, and was started on heparin drip.  He was admitted for further management.    History reviewed. No pertinent past medical history.    History reviewed. No pertinent surgical history.    Review of patient's allergies indicates:  No Known Allergies    No current facility-administered medications on file prior to encounter.     No current outpatient medications on file prior to encounter.     Family History    None       Tobacco Use    Smoking status: Not on file    Smokeless tobacco: Not on file   Substance and Sexual Activity    Alcohol use: Not on file    Drug use: Not on file    Sexual activity: Not on file     Review of Systems   Constitutional: Negative.    HENT: Negative.     Eyes: Negative.    Respiratory: Negative.     Cardiovascular:  Positive for chest pain.    Gastrointestinal: Negative.    Endocrine: Negative.    Genitourinary: Negative.    Musculoskeletal: Negative.    Skin: Negative.    Allergic/Immunologic: Negative.    Neurological: Negative.    Psychiatric/Behavioral: Negative.       Objective:     Vital Signs (Most Recent):  Temp: 98.9 °F (37.2 °C) (03/22/24 1855)  Pulse: 60 (03/22/24 2047)  Resp: (!) 23 (03/22/24 2047)  BP: 104/71 (03/22/24 2047)  SpO2: 99 % (03/22/24 2047) Vital Signs (24h Range):  Temp:  [98.9 °F (37.2 °C)] 98.9 °F (37.2 °C)  Pulse:  [59-63] 60  Resp:  [18-26] 23  SpO2:  [98 %-100 %] 99 %  BP: (101-105)/(66-71) 104/71     Weight: 69.9 kg (154 lb)  Body mass index is 25.63 kg/m².     Physical Exam  Vitals and nursing note reviewed.   Constitutional:       General: He is not in acute distress.     Appearance: Normal appearance. He is not ill-appearing.   HENT:      Head: Normocephalic and atraumatic.      Nose: Nose normal.      Mouth/Throat:      Mouth: Mucous membranes are moist.   Eyes:      Extraocular Movements: Extraocular movements intact.   Cardiovascular:      Rate and Rhythm: Normal rate.      Pulses: Normal pulses.      Heart sounds: No murmur heard.  Pulmonary:      Effort: Pulmonary effort is normal. No respiratory distress.   Abdominal:      General: Abdomen is flat.      Palpations: Abdomen is soft.      Tenderness: There is no abdominal tenderness.   Musculoskeletal:      Right lower leg: No edema.      Left lower leg: No edema.   Skin:     General: Skin is warm.      Capillary Refill: Capillary refill takes less than 2 seconds.   Neurological:      General: No focal deficit present.      Mental Status: He is alert.   Psychiatric:         Mood and Affect: Mood normal.                Significant Labs: All pertinent labs within the past 24 hours have been reviewed.    Significant Imaging: I have reviewed all pertinent imaging results/findings within the past 24 hours.  Assessment/Plan:     * NSTEMI (non-ST elevated myocardial  infarction)  Concern for NSTEMI  Continue aspirin, Plavix, statin and heparin drip   Cardiology consult   Echocardiogram   NPO after MN    Smoking  I spent a total of 4 minutes counseling the patient on smoking cessation.           VTE Risk Mitigation (From admission, onward)           Ordered     IP VTE LOW RISK PATIENT  Once         03/22/24 2159     Place sequential compression device  Until discontinued         03/22/24 2159     heparin 25,000 units in dextrose 5% (100 units/ml) IV bolus from bag LOW INTENSITY nomogram - [facility]  As needed (PRN)        Question:  Heparin Infusion Adjustment (DO NOT MODIFY ANSWER)  Answer:  \\[email or web address]\Images\Pharmacy\HeparinInfusions\heparin LOW INTENSITY nomogram for [facility] [id number]    03/22/24 2045     heparin 25,000 units in dextrose 5% (100 units/ml) IV bolus from bag LOW INTENSITY nomogram - [facility]  As needed (PRN)        Question:  Heparin Infusion Adjustment (DO NOT MODIFY ANSWER)  Answer:  \[email or web address]\[email or web address]\Images\Pharmacy\HeparinInfusions\heparin LOW INTENSITY nomogram for [facility] [id number]    03/22/24 2045     heparin 25,000 units in dextrose 5% 250 mL (100 units/mL) infusion LOW INTENSITY nomogram - [facility]  Continuous        Question:  Begin at (units/kg/hr)  Answer:  12    03/22/24 2045                     AdmissionCare    Guideline: Myocardial Infarction, Inpatient    Based on the indications selected for the patient, the bed status of Inpatient was determined to be MET    The following indications were selected as present at the time of evaluation of the patient:      - Acute myocardial infarction (MI) (not in context of cardiac procedure within last 48 hours), as indicated by ALL of the following:    - Elevated cardiac troponin level, as indicated by 1 or more of the following:     - New or presumed new elevation of cardiac troponin level (ie, elevation clearly not due to chronic condition)    - Myocardial injury due to acute ischemia, as indicated by 1 or more of the  following:     - Symptoms consistent with myocardial ischemia (eg, chest pain, dyspnea)    AdmissionCare documentation entered by: David Hummel    [facility] [employer], 27th edition, Copyright © 2023 [facility] [employer], [facility] All Rights Reserved.  [id number]:19:21-05:00    Dimitri Johnson MD  Department of Hospital Medicine  [facility]

## 2024-03-23 NOTE — NURSING
aPTT 52.1, therapeutic, no changed in dose rate will cont infusing 12units/kg/he @7.8ml/hr. Next aPTT @0930. Will cont to monitor

## 2024-03-23 NOTE — SUBJECTIVE & OBJECTIVE
Reason for Disposition  • Cough with no complications (all triage questions negative)    Protocols used: COUGH-P-AH     History reviewed. No pertinent past medical history.    History reviewed. No pertinent surgical history.    Review of patient's allergies indicates:  No Known Allergies    No current facility-administered medications on file prior to encounter.     No current outpatient medications on file prior to encounter.     Family History    None       Tobacco Use    Smoking status: Not on file    Smokeless tobacco: Not on file   Substance and Sexual Activity    Alcohol use: Not on file    Drug use: Not on file    Sexual activity: Not on file     Review of Systems   Constitutional: Negative.    HENT: Negative.     Eyes: Negative.    Respiratory: Negative.     Cardiovascular:  Positive for chest pain.   Gastrointestinal: Negative.    Endocrine: Negative.    Genitourinary: Negative.    Musculoskeletal: Negative.    Skin: Negative.    Allergic/Immunologic: Negative.    Neurological: Negative.    Psychiatric/Behavioral: Negative.       Objective:     Vital Signs (Most Recent):  Temp: 98.9 °F (37.2 °C) (03/22/24 1855)  Pulse: 60 (03/22/24 2047)  Resp: (!) 23 (03/22/24 2047)  BP: 104/71 (03/22/24 2047)  SpO2: 99 % (03/22/24 2047) Vital Signs (24h Range):  Temp:  [98.9 °F (37.2 °C)] 98.9 °F (37.2 °C)  Pulse:  [59-63] 60  Resp:  [18-26] 23  SpO2:  [98 %-100 %] 99 %  BP: (101-105)/(66-71) 104/71     Weight: 69.9 kg (154 lb)  Body mass index is 25.63 kg/m².     Physical Exam  Vitals and nursing note reviewed.   Constitutional:       General: He is not in acute distress.     Appearance: Normal appearance. He is not ill-appearing.   HENT:      Head: Normocephalic and atraumatic.      Nose: Nose normal.      Mouth/Throat:      Mouth: Mucous membranes are moist.   Eyes:      Extraocular Movements: Extraocular movements intact.   Cardiovascular:      Rate and Rhythm: Normal rate.      Pulses: Normal pulses.      Heart sounds: No murmur heard.  Pulmonary:      Effort: Pulmonary effort is normal. No respiratory distress.   Abdominal:       General: Abdomen is flat.      Palpations: Abdomen is soft.      Tenderness: There is no abdominal tenderness.   Musculoskeletal:      Right lower leg: No edema.      Left lower leg: No edema.   Skin:     General: Skin is warm.      Capillary Refill: Capillary refill takes less than 2 seconds.   Neurological:      General: No focal deficit present.      Mental Status: He is alert.   Psychiatric:         Mood and Affect: Mood normal.                Significant Labs: All pertinent labs within the past 24 hours have been reviewed.    Significant Imaging: I have reviewed all pertinent imaging results/findings within the past 24 hours.

## 2024-03-23 NOTE — NURSING
Pt arrived on unit via stretcher, awake alert and oriented. Accompanied by Wife. IV site noted, cont Heparin infusing; next aPTT @0330. Pt on room air, no distress noted. Vitals assessed. Pt placed on tele #[id number]. Scheduled medications given. No complaint of pain/discomfort. Safety measures maintained. Will cont to monitor

## 2024-03-23 NOTE — HPI
Patient is a pleasant 64-year-old man.  Came in with substernal chest pressure.  Troponin mansoor to 32.  Currently chest pain-free.  Laying comfortably in bed.    Results for orders placed during the hospital encounter of 03/22/24    Echo    Interpretation Summary    Left Ventricle: The left ventricle is normal in size. Mildly increased wall thickness. Septal thickening. There is concentric hypertrophy. There is normal systolic function with a visually estimated ejection fraction of 55 - 60%. Grade I diastolic dysfunction.    Right Ventricle: Normal right ventricular cavity size. Systolic function is normal.    Left Atrium: Left atrium is moderately dilated.    Right Atrium: Right atrium is mildly dilated.    Mitral Valve: There is mild regurgitation.    Tricuspid Valve: There is mild regurgitation.    Pulmonary Artery: The estimated pulmonary artery systolic pressure is 25 mmHg.    IVC/SVC: Normal venous pressure at 3 mmHg.      HPI:  This is a 64-year-old male with no significant past medical history who presents with chest pain.       Patient presents with chest pain that started on the day of presentation.  He describes a pressure-like pain with radiation to his arms.  He reports that his arms felt numb/heavy which is now resolved.  Associated symptoms include nausea.  He smokes about a pack per day, for close to 50 years.      In the ED, the patient was hemodynamically stable.  Labs were remarkable for an elevated troponin (1.471).  EKG showed T-wave flattening in some inferior leads.  Patient was given aspirin 325 mg, and was started on heparin drip.  He was admitted for further management.     Overview/Hospital Course:  Admitted with NSTEMI and trop of 1.4 that exponentially went up overnight to 32.9 on ACL meds. ECHO and repeat EKG ordered. Cardiology consulted, assume [facility] today, renal fxn good.

## 2024-03-23 NOTE — NURSING
Arrived to ICU from cath lab following stent placement.  Pt is drowsy but easily arousable and oriented.  He was oriented to ICU routine and personnel.   Vas band intact to right wrist without bleeding or palpable hematoma.  VS as per flow sheet.

## 2024-03-23 NOTE — HOSPITAL COURSE
Admitted with NSTEMI and trop of 1.4 that exponentially went up overnight to 32.9 on ACL meds. ECHO and repeat EKG ordered. Cardiology consulted, assume [facility] today, renal fxn good.

## 2024-03-23 NOTE — PLAN OF CARE
03/23/24 0830   Discharge Assessment   Assessment Type Discharge Planning Assessment   People in Home spouse   Facility Arrived From: home   Do you expect to return to your current living situation? Yes   Do you have help at home or someone to help you manage your care at home? Yes   Who are your caregiver(s) and their phone number(s)? Sabina Avila  Spouse  [phone]   Prior to hospitilization cognitive status: Alert/Oriented   Current cognitive status: Alert/Oriented   Walking or Climbing Stairs Difficulty no   Dressing/Bathing Difficulty no   Equipment Currently Used at Home none   Readmission within 30 days? No   Patient currently being followed by outpatient case management? No   Do you currently have service(s) that help you manage your care at home? No   Do you take prescription medications? No   Do you have prescription coverage? No   Do you have any problems affording any of your prescribed medications? TBD   How do you get to doctors appointments? family or friend will provide   Are you on dialysis? No   Do you take coumadin? No   Discharge Plan A Home with family   Discharge Plan B Home with family

## 2024-03-23 NOTE — ED PROVIDER NOTES
Encounter Date: 3/22/2024       History     Chief Complaint   Patient presents with    Chest Pain     Pt c/o left sided non radiating CP starting today. Denies any SOB      64 y.o. male who has no past medical history on file presents to the emergency department due to chest pain substernal location that has been ongoing for the past day.  Patient reports pain is worsened when he lays flat or attempts to elevate his arms.  Denies any associated shortness of breath, diaphoresis or nausea.  He reports having a chronic cough secondary to cigarette use.  He reports smoking a pack a day.  Denies any change.  Cough is nonproductive.  Denies any fevers or chills.  Denies taking any medication prior to ED arrival.  Denies history prior heart attacks and does not taking any medications chronically.    The history is provided by the patient and the spouse.     Review of patient's allergies indicates:  No Known Allergies  History reviewed. No pertinent past medical history.  History reviewed. No pertinent surgical history.  History reviewed. No pertinent family history.     Review of Systems   Constitutional:  Negative for fever.   HENT:  Negative for sore throat.    Respiratory:  Negative for shortness of breath.    Cardiovascular:  Positive for chest pain.   Gastrointestinal:  Negative for nausea.   Genitourinary:  Negative for dysuria.   Musculoskeletal:  Negative for back pain.   Skin:  Negative for rash.   Neurological:  Negative for weakness.   Hematological:  Does not bruise/bleed easily.       Physical Exam     Initial Vitals [03/22/24 1855]   BP Pulse Resp Temp SpO2   102/66 63 18 98.9 °F (37.2 °C) 98 %      MAP       --         Physical Exam    Nursing note and vitals reviewed.  Constitutional: He is not diaphoretic. No distress.   HENT:   Head: Normocephalic and atraumatic.   Eyes: Conjunctivae and EOM are normal. Pupils are equal, round, and reactive to light.   Neck:   Normal range of motion.  Cardiovascular:   Regular rhythm.           Pulses:       Radial pulses are 2+ on the right side and 2+ on the left side.        Posterior tibial pulses are 2+ on the right side and 2+ on the left side.   Pulmonary/Chest: Breath sounds normal. No respiratory distress.     Abdominal: Abdomen is soft. Bowel sounds are normal. He exhibits no distension. There is no abdominal tenderness. There is no rebound and no guarding.   Musculoskeletal:         General: No tenderness. Normal range of motion.      Cervical back: Normal range of motion.     Lymphadenopathy:     He has no cervical adenopathy.   Neurological: He is alert and oriented to person, place, and time.   Skin: Skin is warm. Capillary refill takes less than 2 seconds.   Psychiatric: He has a normal mood and affect. His behavior is normal.         ED Course   Procedures  Labs Reviewed   CBC W/ AUTO DIFFERENTIAL - Abnormal; Notable for the following components:       Result Value    Gran % 73.3 (*)     All other components within normal limits   COMPREHENSIVE METABOLIC PANEL - Abnormal; Notable for the following components:    CO2 22 (*)     Glucose 114 (*)     AST 65 (*)     All other components within normal limits   TROPONIN I - Abnormal; Notable for the following components:    Troponin I 1.471 (*)     All other components within normal limits   B-TYPE NATRIURETIC PEPTIDE   APTT    Narrative:     Draw baseline aPTT prior to starting the heparin bolus or  infusion  (if patient is on warfarin prior to heparin therapy)   PROTIME-INR    Narrative:     Draw baseline aPTT prior to starting the heparin bolus or  infusion  (if patient is on warfarin prior to heparin therapy)   TROPONIN I          Imaging Results              X-Ray Chest PA And Lateral (Final result)  Result time 03/22/24 19:11:38      Final result by Tate Rowell MD (03/22/24 19:11:38)                   Impression:      No acute cardiopulmonary process identified.      Electronically signed by: Tate Rowell  MD  Date:    03/22/2024  Time:    19:11               Narrative:    EXAMINATION:  XR CHEST PA AND LATERAL    CLINICAL HISTORY:  Chest pain, unspecified    TECHNIQUE:  PA and lateral views of the chest were performed.    COMPARISON:  None    FINDINGS:  Cardiac silhouette is normal in size.  Lungs are symmetrically expanded.  Minimal scarring is seen at the left lung base.  No evidence of focal consolidative process, pneumothorax, or significant pleural effusion.  No acute osseous abnormality identified.                                       Medications   heparin 25,000 units in dextrose 5% 250 mL (100 units/mL) infusion LOW INTENSITY nomogram - [facility] (12 Units/kg/hr × 64.9 kg (Adjusted) Intravenous New Bag 3/22/24 2510)   heparin 25,000 units in dextrose 5% (100 units/ml) IV bolus from bag LOW INTENSITY nomogram - [facility] (has no administration in time range)   heparin 25,000 units in dextrose 5% (100 units/ml) IV bolus from bag LOW INTENSITY nomogram - [facility] (has no administration in time range)   sodium chloride 0.9% flush 10 mL (has no administration in time range)   albuterol-ipratropium 2.5 mg-0.5 mg/3 mL nebulizer solution 3 mL (has no administration in time range)   melatonin tablet 6 mg (has no administration in time range)   ondansetron injection 4 mg (has no administration in time range)   prochlorperazine injection Soln 5 mg (has no administration in time range)   polyethylene glycol packet 17 g (has no administration in time range)   bisacodyL suppository 10 mg (has no administration in time range)   acetaminophen tablet 650 mg (has no administration in time range)   simethicone chewable tablet 80 mg (has no administration in time range)   aluminum-magnesium hydroxide-simethicone 200-200-20 mg/5 mL suspension 30 mL (has no administration in time range)   acetaminophen tablet 650 mg (has no administration in time range)   naloxone 0.4 mg/mL injection 0.02 mg (has no administration in time range)   potassium  bicarbonate disintegrating tablet 50 mEq (has no administration in time range)   potassium bicarbonate disintegrating tablet 35 mEq (has no administration in time range)   potassium bicarbonate disintegrating tablet 60 mEq (has no administration in time range)   magnesium oxide tablet 800 mg (has no administration in time range)   magnesium oxide tablet 800 mg (has no administration in time range)   potassium, sodium phosphates 280-160-250 mg packet 2 packet (has no administration in time range)   potassium, sodium phosphates 280-160-250 mg packet 2 packet (has no administration in time range)   potassium, sodium phosphates 280-160-250 mg packet 2 packet (has no administration in time range)   glucose chewable tablet 16 g (has no administration in time range)   glucose chewable tablet 24 g (has no administration in time range)   glucagon (human recombinant) injection 1 mg (has no administration in time range)   dextrose 10% bolus 125 mL 125 mL (has no administration in time range)   dextrose 10% bolus 250 mL 250 mL (has no administration in time range)   atorvastatin tablet 40 mg (has no administration in time range)   clopidogreL tablet 75 mg (has no administration in time range)   aspirin chewable tablet 81 mg (has no administration in time range)   clopidogreL tablet 300 mg (has no administration in time range)   aspirin tablet 325 mg (325 mg Oral Given 3/22/24 1952)   heparin 25,000 units in dextrose 5% (100 units/ml) IV bolus from bag LOW INTENSITY nomogram - [facility] (3,890 Units Intravenous Bolus from Bag 3/22/24 2135)     Medical Decision Making:   Initial Assessment:   64-year-old male no significant past medical history presents to the emergency department with substernal chest pain that has been ongoing for the past day.  Patient in no acute distress, vitals notable for soft blood pressure otherwise unremarkable.  No signs of volume overload on exam.  EKG with no STEMI. HEART score: 4. Given the timing of pain  to ER presentation, plan to send delta troponin to evaluate for NSTEMI. Presentation not consistent with acute PE (Wells low risk ), pneumothorax, thoracic arotic dissection, cardiac effusion or tamponade.    Plan: labs, troponin, EKG, CXR, ASA, pain control, serial reassessment   Differential Diagnosis:   Differential Diagnosis includes, but is not limited to:  ACS/MI, PE, aortic dissection, pneumothorax, cardiac tamponade, pericarditis/myocarditis, pneumonia, infection/abscess, lung mass, trauma/fracture, costochondritis/pleurisy, MSK pain/contusion, GERD, biliary disease, pancreatitis, anemia   Clinical Tests:   Lab Tests: Ordered and Reviewed  Radiological Study: Ordered and Reviewed  Medical Tests: Ordered and Reviewed    Additional MDM:   Heart Score:    History:          Slightly suspicious.  ECG:             Nonspecific repolarisation disturbance  Age:               45-65 years  Risk factors: no risk factors known  Troponin:       >2x normal limit  Heart Score = 4                ED Course as of 03/22/24 2204   Fri Mar 22, 2024   1949 Independent Interpretation of EKG:  Rhythm: Sinus   Rate: 61  QTC: 432  No STEMI   Nonspecific ST segment abnormalities [AS]   2007 CBC auto differential(!)  CBC without significant leukocytosis, anemia (at baseline), or platelet abnormalities.   [AS]   2007 X-Ray Chest PA And Lateral  Chest x-ray without any acute intrathoracic abnormalities. [AS]   2017 Comprehensive metabolic panel(!) [AS]   2043 Troponin I(!): 1.471  Given significant elevation will treat as an NSTEMI.  Patient currently chest pain-free.  Overall hemodynamically stable. [AS]   2122 After review of the patient's physical exam, ED testing, and history/symptoms, the patient requires additional care in the hospital for the treatment of NSTEMI . Discussed patients case with Dr. Johnson who will accept the patient and any pending labs/imaging/interventions.   I discussed the objective findings with the patient  including laboratory studies, diagnostic imaging, and any consultant involvement. The patient/ family was educated on their clinical presentation and all questions were answered. They acknowledged and verbally agreed to the treatment plan. The patient will be admitted to the hospital for further management.    [AS]      ED Course User Index  [AS] Jovani Morris MD          Medical Decision Making  Amount and/or Complexity of Data Reviewed  Labs: ordered. Decision-making details documented in ED Course.  Radiology:  Decision-making details documented in ED Course.    Risk  OTC drugs.  Prescription drug management.  Decision regarding hospitalization.         Critical Care Procedure Note  Authorized and Performed by: Jovani Morris MD  Total critical care time: 60 minutes  Due to a high probability of clinically significant, life threatening deterioration, the patient required my highest level of preparedness to intervene emergently and I personally spent this critical care time directly and personally managing the patient. This critical care time included obtaining a history; examining the patient; pulse oximetry; ordering and review of studies; arranging urgent treatment with development of a management plan; evaluation of patient's response to treatment; frequent reassessment; and, discussions with other providers.  This critical care time was performed to assess and manage the high probability of imminent, life-threatening deterioration that could result in multi-organ failure. It was exclusive of separately billable procedures and treating other patients and teaching time.  Please see MDM section and the rest of the note for further information on patient assessment and treatment.    Clinical Impression:   Final diagnoses:  [R07.9] Chest pain  [I21.4] NSTEMI (non-ST elevated myocardial infarction)          ED Disposition Condition    Admit Stable               DISCLAIMER: This note was prepared with [employer]  voice recognition transcription software. Garbled syntax, mangled pronouns, and other bizarre constructions may be attributed to that software system.     Jovani Morris MD  03/22/24 4369

## 2024-03-23 NOTE — NURSING
[facility] [place]  Nurses Note -- 4 Eyes      3/23/2024       Skin assessed on: Q Shift      [x] No Pressure Injuries Present    []Prevention Measures Documented    [] Yes LDA  for Pressure Injury Previously documented     [] Yes New Pressure Injury Discovered   [] LDA for New Pressure Injury Added      Attending RN:  Augusta Corey RN     Second RN:  GARCIA Guerra

## 2024-03-23 NOTE — ADMISSIONCARE
AdmissionCare    Guideline: Myocardial Infarction, Inpatient    Based on the indications selected for the patient, the bed status of Inpatient was determined to be MET    The following indications were selected as present at the time of evaluation of the patient:      - Acute myocardial infarction (MI) (not in context of cardiac procedure within last 48 hours), as indicated by ALL of the following:    - Elevated cardiac troponin level, as indicated by 1 or more of the following:     - New or presumed new elevation of cardiac troponin level (ie, elevation clearly not due to chronic condition)    - Myocardial injury due to acute ischemia, as indicated by 1 or more of the following:     - Symptoms consistent with myocardial ischemia (eg, chest pain, dyspnea)    AdmissionCare documentation entered by: David Hummel    [facility], 27th edition, Copyright © 2023 [facility], [facility] All Rights Reserved.  [id number]:19:21-05:00

## 2024-03-23 NOTE — CONSULTS
[facility] Surg  Cardiology  Consult Note    Patient Name: Jt Avila  MRN: [id number]  Admission Date: 3/22/2024  Hospital Length of Stay: 1 days  Code Status: Full Code   Attending Provider: patient and ER records  Consulting Provider: Srinivasan Colorado MD  Primary Care Physician: Unable, To Obtain  Principal Problem:NSTEMI (non-ST elevated myocardial infarction)    Patient information was obtained from patient and ER records.     Inpatient consult to Cardiology  Consult performed by: Srinivasan Colorado MD  Consult ordered by: Dimitri Johnson MD        Subjective:     Chief Complaint:  nstemi     HPI:   Patient is a pleasant 64-year-old man.  Came in with substernal chest pressure.  Troponin mansoor to 32.  Currently chest pain-free.  Laying comfortably in bed.    Results for orders placed during the hospital encounter of 03/22/24    Echo    Interpretation Summary    Left Ventricle: The left ventricle is normal in size. Mildly increased wall thickness. Septal thickening. There is concentric hypertrophy. There is normal systolic function with a visually estimated ejection fraction of 55 - 60%. Grade I diastolic dysfunction.    Right Ventricle: Normal right ventricular cavity size. Systolic function is normal.    Left Atrium: Left atrium is moderately dilated.    Right Atrium: Right atrium is mildly dilated.    Mitral Valve: There is mild regurgitation.    Tricuspid Valve: There is mild regurgitation.    Pulmonary Artery: The estimated pulmonary artery systolic pressure is 25 mmHg.    IVC/SVC: Normal venous pressure at 3 mmHg.      HPI:  This is a 64-year-old male with no significant past medical history who presents with chest pain.       Patient presents with chest pain that started on the day of presentation.  He describes a pressure-like pain with radiation to his arms.  He reports that his arms felt numb/heavy which is now resolved.  Associated symptoms include nausea.  He smokes about a pack per day, for close to 50  years.      In the ED, the patient was hemodynamically stable.  Labs were remarkable for an elevated troponin (1.471).  EKG showed T-wave flattening in some inferior leads.  Patient was given aspirin 325 mg, and was started on heparin drip.  He was admitted for further management.     Overview/Hospital Course:  Admitted with NSTEMI and trop of 1.4 that exponentially went up overnight to 32.9 on ACL meds. ECHO and repeat EKG ordered. Cardiology consulted, assume [facility] today, renal fxn good.         History reviewed. No pertinent past medical history.    History reviewed. No pertinent surgical history.    Review of patient's allergies indicates:  No Known Allergies    No current facility-administered medications on file prior to encounter.     No current outpatient medications on file prior to encounter.     Family History    None       Tobacco Use    Smoking status: Not on file    Smokeless tobacco: Not on file   Substance and Sexual Activity    Alcohol use: Not on file    Drug use: Not on file    Sexual activity: Not on file     Review of Systems   Constitutional: Negative.   HENT: Negative.     Eyes: Negative.    Cardiovascular:  Positive for chest pain.   Respiratory: Negative.     Endocrine: Negative.    Hematologic/Lymphatic: Negative.    Skin: Negative.    Musculoskeletal: Negative.    Gastrointestinal: Negative.    Genitourinary: Negative.    Neurological: Negative.    Psychiatric/Behavioral: Negative.     Allergic/Immunologic: Negative.      Objective:     Vital Signs (Most Recent):  Temp: 98.2 °F (36.8 °C) (03/23/24 1225)  Pulse: 76 (03/23/24 1225)  Resp: 16 (03/23/24 1225)  BP: 94/61 (03/23/24 1225)  SpO2: 96 % (03/23/24 1225) Vital Signs (24h Range):  Temp:  [98.2 °F (36.8 °C)-98.9 °F (37.2 °C)] 98.2 °F (36.8 °C)  Pulse:  [59-76] 76  Resp:  [16-26] 16  SpO2:  [96 %-100 %] 96 %  BP: ()/(60-80) 94/61     Weight: 68.1 kg (150 lb 2.1 oz)  Body mass index is 24.98 kg/m².    SpO2: 96 %       No intake or  "output data in the 24 hours ending 03/23/24 1323    Lines/Drains/Airways       Peripheral Intravenous Line  Duration                  Peripheral IV - Single Lumen 03/22/24 2004 20 G Right Antecubital <1 day         Peripheral IV - Single Lumen 03/22/24 2135 20 G Posterior;Right Hand <1 day                     Physical Exam  Vitals reviewed.   Constitutional:       Appearance: He is well-developed.   HENT:      Head: Normocephalic.   Eyes:      Conjunctiva/sclera: Conjunctivae normal.      Pupils: Pupils are equal, round, and reactive to light.   Cardiovascular:      Rate and Rhythm: Normal rate and regular rhythm.      Heart sounds: Normal heart sounds.   Pulmonary:      Effort: Pulmonary effort is normal.      Breath sounds: Normal breath sounds.   Abdominal:      General: Bowel sounds are normal.      Palpations: Abdomen is soft.   Musculoskeletal:      Cervical back: Normal range of motion and neck supple.   Skin:     General: Skin is warm.   Neurological:      Mental Status: He is alert and oriented to person, place, and time.          Significant Labs:     DATA:     Laboratory:  CBC:  Recent Labs   Lab 03/22/24 1941 03/23/24  0345   WBC 7.95 10.35   Hemoglobin 15.0 14.5   Hematocrit 45.5 43.1   Platelets 208 210       CHEMISTRIES:  Recent Labs   Lab 03/22/24 1941 03/23/24  0345   Glucose 114 H 108   Sodium 138 139   Potassium 4.1 3.5   BUN 8 8   Creatinine 0.9 0.8   Calcium 9.1 8.5 L   Magnesium  --  2.2       CARDIAC BIOMARKERS:  Recent Labs   Lab 03/22/24 1941 03/22/24  2314 03/23/24  0345   Troponin I 1.471 H 5.212 H 32.935 H       COAGS:  Recent Labs   Lab 03/22/24 2054 03/23/24  0345   INR 1.0 1.0       LIPIDS/LFTS:  Recent Labs   Lab 03/22/24 1941 03/23/24  0345   Cholesterol  --  210 H   Triglycerides  --  78   HDL  --  39 L   LDL Cholesterol  --  155.4   Non-HDL Cholesterol  --  171   AST 65 H  --    ALT 23  --        No results found for: "HGBA1C"    TSH        The ASCVD Risk score (Kareem DK, et " al., 2019) failed to calculate for the following reasons:    The patient has a prior MI or stroke diagnosis       BNP    Lab Results   Component Value Date/Time    BNP 48 03/22/2024 07:41 PM            ECHO    Results for orders placed during the hospital encounter of 03/22/24    Echo    Interpretation Summary    Left Ventricle: The left ventricle is normal in size. Mildly increased wall thickness. Septal thickening. There is concentric hypertrophy. There is normal systolic function with a visually estimated ejection fraction of 55 - 60%. Grade I diastolic dysfunction.    Right Ventricle: Normal right ventricular cavity size. Systolic function is normal.    Left Atrium: Left atrium is moderately dilated.    Right Atrium: Right atrium is mildly dilated.    Mitral Valve: There is mild regurgitation.    Tricuspid Valve: There is mild regurgitation.    Pulmonary Artery: The estimated pulmonary artery systolic pressure is 25 mmHg.    IVC/SVC: Normal venous pressure at 3 mmHg.      STRESS TEST    No results found for this or any previous visit.        CATH    No results found for this or any previous visit.      Assessment and Plan:     * NSTEMI (non-ST elevated myocardial infarction)  Non-STEMI.  Aspirin Plavix heparin drip.  Cardiac catheterization    Risks, benefits and alternatives of the catheterization procedure were discussed with the patient.The risks of coronary angiography include but are not limited to: bleeding, infection, death, heart attack, arrhythmia, kidney injury or failure, potential need for dialysis, allergic reactions, stroke, need for emergency surgery, hematoma, pseudoaneurysm etc.  Should stenting be indicated, the patient has agreed to dual anti-platelet therapy for 1-consecutive year with a drug-eluting stent and a minimum of 1-month with the use of a bare metal stent. Additionally, pt is aware that non-compliance is likely to result in stent clotting with heart attack, heart failure, and/or  death  The risks of moderate sedation include hypotension, respiratory depression, arrhythmias, bronchospasm, and death. Informed consent was obtained and the  patient is agreeable to proceed with the procedure. Consent was placed on the chart.      Smoking            VTE Risk Mitigation (From admission, onward)           Ordered     IP VTE LOW RISK PATIENT  Once         03/22/24 2159     Place sequential compression device  Until discontinued         03/22/24 2159     heparin 25,000 units in dextrose 5% (100 units/ml) IV bolus from bag LOW INTENSITY nomogram - [facility]  As needed (PRN)        Question:  Heparin Infusion Adjustment (DO NOT MODIFY ANSWER)  Answer:  [email or web address]\Images\Pharmacy\HeparinInfusions\heparin LOW INTENSITY nomogram for [facility] [id number]    03/22/24 2045     heparin 25,000 units in dextrose 5% (100 units/ml) IV bolus from bag LOW INTENSITY nomogram - [facility]  As needed (PRN)        Question:  Heparin Infusion Adjustment (DO NOT MODIFY ANSWER)  Answer:  \[email or web address]\Images\Pharmacy\HeparinInfusions\heparin LOW INTENSITY nomogram for [facility] [id number]    03/22/24 2045     heparin 25,000 units in dextrose 5% 250 mL (100 units/mL) infusion LOW INTENSITY nomogram - [facility]  Continuous        Question:  Begin at (units/kg/hr)  Answer:  12    03/22/24 2045                    Thank you for your consult. I will sign off. Please contact us if you have any additional questions.    Srinivasan Colorado MD  Cardiology   [facility]

## 2024-03-23 NOTE — HPI
This is a 64-year-old male with no significant past medical history who presents with chest pain.      Patient presents with chest pain that started on the day of presentation.  He describes a pressure-like pain with radiation to his arms.  He reports that his arms felt numb/heavy which is now resolved.  Associated symptoms include nausea.  He smokes about a pack per day, for close to 50 years.     In the ED, the patient was hemodynamically stable.  Labs were remarkable for an elevated troponin (1.471).  EKG showed T-wave flattening in some inferior leads.  Patient was given aspirin 325 mg, and was started on heparin drip.  He was admitted for further management.

## 2024-03-24 VITALS
SYSTOLIC BLOOD PRESSURE: 107 MMHG | RESPIRATION RATE: 19 BRPM | TEMPERATURE: 98 F | HEIGHT: 65 IN | WEIGHT: 150.13 LBS | HEART RATE: 63 BPM | BODY MASS INDEX: 25.01 KG/M2 | OXYGEN SATURATION: 95 % | DIASTOLIC BLOOD PRESSURE: 63 MMHG

## 2024-03-24 LAB
ANION GAP SERPL CALC-SCNC: 6 MMOL/L (ref 8–16)
BUN SERPL-MCNC: 9 MG/DL (ref 8–23)
CALCIUM SERPL-MCNC: 7.9 MG/DL (ref 8.7–10.5)
CHLORIDE SERPL-SCNC: 114 MMOL/L (ref 95–110)
CO2 SERPL-SCNC: 21 MMOL/L (ref 23–29)
CREAT SERPL-MCNC: 0.8 MG/DL (ref 0.5–1.4)
EST. GFR  (NO RACE VARIABLE): >60 ML/MIN/1.73 M^2
GLUCOSE SERPL-MCNC: 126 MG/DL (ref 70–110)
POCT GLUCOSE: 86 MG/DL (ref 70–110)
POTASSIUM SERPL-SCNC: 3 MMOL/L (ref 3.5–5.1)
SODIUM SERPL-SCNC: 141 MMOL/L (ref 136–145)

## 2024-03-24 PROCEDURE — 25000003 PHARM REV CODE 250: Performed by: INTERNAL MEDICINE

## 2024-03-24 PROCEDURE — 99233 SBSQ HOSP IP/OBS HIGH 50: CPT | Mod: ,,, | Performed by: INTERNAL MEDICINE

## 2024-03-24 PROCEDURE — 25000003 PHARM REV CODE 250: Performed by: STUDENT IN AN ORGANIZED HEALTH CARE EDUCATION/TRAINING PROGRAM

## 2024-03-24 PROCEDURE — 80048 BASIC METABOLIC PNL TOTAL CA: CPT | Performed by: STUDENT IN AN ORGANIZED HEALTH CARE EDUCATION/TRAINING PROGRAM

## 2024-03-24 PROCEDURE — 36415 COLL VENOUS BLD VENIPUNCTURE: CPT | Performed by: STUDENT IN AN ORGANIZED HEALTH CARE EDUCATION/TRAINING PROGRAM

## 2024-03-24 RX ORDER — NAPROXEN SODIUM 220 MG/1
81 TABLET, FILM COATED ORAL DAILY
Qty: 90 TABLET | Refills: 3 | Status: SHIPPED | OUTPATIENT
Start: 2024-03-25 | End: 2024-03-24

## 2024-03-24 RX ORDER — CLOPIDOGREL BISULFATE 75 MG/1
75 TABLET ORAL DAILY
Qty: 90 TABLET | Refills: 3 | Status: SHIPPED | OUTPATIENT
Start: 2024-03-25 | End: 2024-03-24

## 2024-03-24 RX ORDER — ATORVASTATIN CALCIUM 40 MG/1
40 TABLET, FILM COATED ORAL NIGHTLY
Qty: 90 TABLET | Refills: 3 | Status: SHIPPED | OUTPATIENT
Start: 2024-03-24 | End: 2024-06-07

## 2024-03-24 RX ORDER — NAPROXEN SODIUM 220 MG/1
81 TABLET, FILM COATED ORAL DAILY
Qty: 90 TABLET | Refills: 3 | Status: SHIPPED | OUTPATIENT
Start: 2024-03-25 | End: 2025-03-25

## 2024-03-24 RX ORDER — CLOPIDOGREL BISULFATE 75 MG/1
75 TABLET ORAL DAILY
Qty: 90 TABLET | Refills: 3 | Status: SHIPPED | OUTPATIENT
Start: 2024-03-25 | End: 2025-03-25

## 2024-03-24 RX ORDER — ATORVASTATIN CALCIUM 40 MG/1
40 TABLET, FILM COATED ORAL NIGHTLY
Qty: 90 TABLET | Refills: 3 | Status: SHIPPED | OUTPATIENT
Start: 2024-03-24 | End: 2024-03-24

## 2024-03-24 RX ORDER — POTASSIUM CHLORIDE 750 MG/1
50 TABLET, EXTENDED RELEASE ORAL ONCE
Status: COMPLETED | OUTPATIENT
Start: 2024-03-24 | End: 2024-03-24

## 2024-03-24 RX ADMIN — ASPIRIN 81 MG CHEWABLE TABLET 81 MG: 81 TABLET CHEWABLE at 08:03

## 2024-03-24 RX ADMIN — CLOPIDOGREL BISULFATE 75 MG: 75 TABLET ORAL at 08:03

## 2024-03-24 RX ADMIN — POLYETHYLENE GLYCOL 3350 17 G: 17 POWDER, FOR SOLUTION ORAL at 08:03

## 2024-03-24 RX ADMIN — SODIUM CHLORIDE: 9 INJECTION, SOLUTION INTRAVENOUS at 07:03

## 2024-03-24 RX ADMIN — POTASSIUM CHLORIDE 50 MEQ: 750 TABLET, EXTENDED RELEASE ORAL at 11:03

## 2024-03-24 NOTE — ASSESSMENT & PLAN NOTE
Non-STEMI.  Aspirin Plavix heparin drip.  Cardiac catheterization    Risks, benefits and alternatives of the catheterization procedure were discussed with the patient.The risks of coronary angiography include but are not limited to: bleeding, infection, death, heart attack, arrhythmia, kidney injury or failure, potential need for dialysis, allergic reactions, stroke, need for emergency surgery, hematoma, pseudoaneurysm etc.  Should stenting be indicated, the patient has agreed to dual anti-platelet therapy for 1-consecutive year with a drug-eluting stent and a minimum of 1-month with the use of a bare metal stent. Additionally, pt is aware that non-compliance is likely to result in stent clotting with heart attack, heart failure, and/or death  The risks of moderate sedation include hypotension, respiratory depression, arrhythmias, bronchospasm, and death. Informed consent was obtained and the  patient is agreeable to proceed with the procedure. Consent was placed on the chart.      3/24:  status post PCI of circumflex yesterday.  Follow-up in cardiology clinic to discuss revascularization of LAD and RCA .  Continue aspirin 81 mg daily  indefinitely and Plavix 70 mg daily uninterrupted for at least 1 year and longer if no contraindications.  Statins with goal LDL less than 70

## 2024-03-24 NOTE — PLAN OF CARE
Problem: Adult Inpatient Plan of Care  Goal: Plan of Care Review  Outcome: Met  Goal: Patient-Specific Goal (Individualized)  Outcome: Met  Goal: Absence of Hospital-Acquired Illness or Injury  Outcome: Met  Goal: Optimal Comfort and Wellbeing  Outcome: Met     Problem: Fall Injury Risk  Goal: Absence of Fall and Fall-Related Injury  Outcome: Met

## 2024-03-24 NOTE — NURSING
Pt off unit via bed to cath lab. Pre medications given. Right and left groin clipped, pt tolerated well. NADN. Wife at bedside.

## 2024-03-24 NOTE — SUBJECTIVE & OBJECTIVE
Interval History:  no complications from angiogram yesterday.  Doing fine.  Chest pain-free    History reviewed. No pertinent past medical history.    History reviewed. No pertinent surgical history.    Review of patient's allergies indicates:  No Known Allergies    No current facility-administered medications on file prior to encounter.     No current outpatient medications on file prior to encounter.     Family History    None       Tobacco Use    Smoking status: Not on file    Smokeless tobacco: Not on file   Substance and Sexual Activity    Alcohol use: Not on file    Drug use: Not on file    Sexual activity: Not on file     Review of Systems   Constitutional: Negative.   HENT: Negative.     Eyes: Negative.    Respiratory: Negative.     Endocrine: Negative.    Hematologic/Lymphatic: Negative.    Skin: Negative.    Musculoskeletal: Negative.    Gastrointestinal: Negative.    Genitourinary: Negative.    Neurological: Negative.    Psychiatric/Behavioral: Negative.     Allergic/Immunologic: Negative.      Objective:     Vital Signs (Most Recent):  Temp: 97.9 °F (36.6 °C) (03/24/24 0725)  Pulse: 63 (03/24/24 0725)  Resp: 19 (03/24/24 0725)  BP: 107/63 (03/24/24 0725)  SpO2: 95 % (03/24/24 0725) Vital Signs (24h Range):  Temp:  [97.9 °F (36.6 °C)-98.3 °F (36.8 °C)] 97.9 °F (36.6 °C)  Pulse:  [59-75] 63  Resp:  [16-27] 19  SpO2:  [94 %-96 %] 95 %  BP: ()/(57-72) 107/63     Weight: 68.1 kg (150 lb 2.1 oz)  Body mass index is 24.98 kg/m².    SpO2: 95 %         Intake/Output Summary (Last 24 hours) at 3/24/2024 1731  Last data filed at 3/24/2024 0944  Gross per 24 hour   Intake 120 ml   Output 300 ml   Net -180 ml         Lines/Drains/Airways       None                    Physical Exam  Vitals reviewed.   Constitutional:       Appearance: He is well-developed.   HENT:      Head: Normocephalic.   Eyes:      Conjunctiva/sclera: Conjunctivae normal.      Pupils: Pupils are equal, round, and reactive to light.    Cardiovascular:      Rate and Rhythm: Normal rate and regular rhythm.      Heart sounds: Normal heart sounds.   Pulmonary:      Effort: Pulmonary effort is normal.      Breath sounds: Normal breath sounds.   Abdominal:      General: Bowel sounds are normal.      Palpations: Abdomen is soft.   Musculoskeletal:      Cervical back: Normal range of motion and neck supple.   Skin:     General: Skin is warm.   Neurological:      Mental Status: He is alert and oriented to person, place, and time.          Significant Labs:     DATA:     Laboratory:  CBC:  Recent Labs   Lab 03/22/24 1941 03/23/24  0345   WBC 7.95 10.35   Hemoglobin 15.0 14.5   Hematocrit 45.5 43.1   Platelets 208 210         CHEMISTRIES:  Recent Labs   Lab 03/22/24 1941 03/23/24  0345 03/24/24  0954   Glucose 114 H 108 126 H   Sodium 138 139 141   Potassium 4.1 3.5 3.0 L   BUN 8 8 9   Creatinine 0.9 0.8 0.8   Calcium 9.1 8.5 L 7.9 L   Magnesium  --  2.2  --          CARDIAC BIOMARKERS:  Recent Labs   Lab 03/22/24 1941 03/22/24 2314 03/23/24  0345   Troponin I 1.471 H 5.212 H 32.935 H         COAGS:  Recent Labs   Lab 03/22/24 2054 03/23/24  0345   INR 1.0 1.0         LIPIDS/LFTS:  Recent Labs   Lab 03/22/24 1941 03/23/24  0345   Cholesterol  --  210 H   Triglycerides  --  78   HDL  --  39 L   LDL Cholesterol  --  155.4   Non-HDL Cholesterol  --  171   AST 65 H  --    ALT 23  --          Hemoglobin A1C   Date Value Ref Range Status   03/23/2024 5.4 4.0 - 5.6 % Final     Comment:     ADA Screening Guidelines:  5.7-6.4%  Consistent with prediabetes  >or=6.5%  Consistent with diabetes    High levels of fetal hemoglobin interfere with the HbA1C  assay. Heterozygous hemoglobin variants (HbS, HgC, etc)do  not significantly interfere with this assay.   However, presence of multiple variants may affect accuracy.         TSH        The ASCVD Risk score (Kareem DK, et al., 2019) failed to calculate for the following reasons:    The patient has a prior MI or  stroke diagnosis       BNP    Lab Results   Component Value Date/Time    BNP 48 03/22/2024 07:41 PM            ECHO    Results for orders placed during the hospital encounter of 03/22/24    Echo    Interpretation Summary    Left Ventricle: The left ventricle is normal in size. Mildly increased wall thickness. Septal thickening. There is concentric hypertrophy. There is normal systolic function with a visually estimated ejection fraction of 55 - 60%. Grade I diastolic dysfunction.    Right Ventricle: Normal right ventricular cavity size. Systolic function is normal.    Left Atrium: Left atrium is moderately dilated.    Right Atrium: Right atrium is mildly dilated.    Mitral Valve: There is mild regurgitation.    Tricuspid Valve: There is mild regurgitation.    Pulmonary Artery: The estimated pulmonary artery systolic pressure is 25 mmHg.    IVC/SVC: Normal venous pressure at 3 mmHg.      STRESS TEST    No results found for this or any previous visit.        CATH    Results for orders placed during the hospital encounter of 03/22/24    Cardiac catheterization    Conclusion    There was three vessel coronary artery disease.    Successful IVUS guided PCI of 95% stenosis in proximal circumflex with NORMAN x1.  This was the culprit vessel for the patient's non-STEMI Prox Cx lesion: A .    The Prox Cx lesion was 95% stenosed with 0% stenosis post-intervention.    The Prox RCA lesion was 100% stenosed.    The Mid LAD lesion was 80% stenosed.    The post-procedure left ventricular end diastolic pressure was 20.    Prox Cx lesion: A STENT SYNERGY XD [id number] stent was successfully placed at 11 AURELIO for 15 sec.    The estimated blood loss was <50 mL.    Coronary angiogram:    Left main:  No significant stenosis    Lad:  Proximal 30-40% stenosis and mid 80% stenosis    Circumflex: Culprit vessel.  Proximal 95% stenosis.  Successful PCI with NORMAN    RCA:  Proximal .  Fills via bridging collaterals from proximal RCA to mid RCA as  well as collaterals from septal branches of the LAD    Access: Right radial artery access    Assessment and plan    Continue aspirin and Plavix    Follow-up in Cardiology Clinic with Dr. Soliz to discuss PCI of LAD +/-  revascularization of RCA if needed versus coronary artery bypass grafting    Statin therapy with goal LDL less than 70                The procedure log was documented by Documenter: Sumeet Barreto RN and verified by Srinivasan [place] MD.    Date: 3/23/2024  Time: 3:09 PM

## 2024-03-24 NOTE — PROGRESS NOTES
AVS virtually reviewed with patient in its entirety with emphasis on medications, follow-up appointments and reasons to return to the ED or contact the [facility] On Call Nurse Care Line. Patient also encouraged to utilize their patient portal. Ease and convenience of use reiterated. Education complete and patient voiced understanding. All questions answered. Discharge teaching complete.     Return to work note completed.

## 2024-03-24 NOTE — PLAN OF CARE
03/24/24 0958   Final Note   Assessment Type Final Discharge Note   Anticipated Discharge Disposition Home  (Home with family; follow-ups)   What phone number can be called within the next 1-3 days to see how you are doing after discharge?   ([phone])   Hospital Resources/Appts/Education Provided Provided patient/caregiver with written discharge plan information;Provided education on problems/symptoms using teachback;Appointments scheduled and added to AVS;Post-Acute resouces added to AVS   Post-Acute Status   Post-Acute Authorization Other  (Home with family; follow-ups)   Other Status No Post-Acute Service Needs   Discharge Delays None known at this time

## 2024-03-24 NOTE — DISCHARGE SUMMARY
[facility] Medicine  Discharge Summary      Patient Name: Jt Avila  MRN: [id number]  [facility]: [id number]  Patient Class: IP- Inpatient  Admission Date: 3/22/2024  Hospital Length of Stay: 2 days  Discharge Date and Time:  03/24/2024 10:14 AM  Attending Physician: Hugh Agosto MD   Discharging Provider: Hugh Agosto MD  Primary Care Provider: Unable, To Obtain    Primary Care Team: Networked reference to record PCT     HPI:   This is a 64-year-old male with no significant past medical history who presents with chest pain.      Patient presents with chest pain that started on the day of presentation.  He describes a pressure-like pain with radiation to his arms.  He reports that his arms felt numb/heavy which is now resolved.  Associated symptoms include nausea.  He smokes about a pack per day, for close to 50 years.     In the ED, the patient was hemodynamically stable.  Labs were remarkable for an elevated troponin (1.471).  EKG showed T-wave flattening in some inferior leads.  Patient was given aspirin 325 mg, and was started on heparin drip.  He was admitted for further management.    Procedure(s) (LRB):  Left heart cath (Left)  IVUS, Coronary  Angioplasty-coronary  Stent, Drug Eluting, Single Vessel, Coronary      Hospital Course:   Admitted with NSTEMI and trop of 1.4 that exponentially went up overnight to 32.9 on ACL meds. ECHO and repeat EKG ordered. Cardiology consulted, assume [facility] today, renal fxn good.          There was three vessel coronary artery disease.    Successful IVUS guided PCI of 95% stenosis in proximal circumflex with NORMAN x1.  This was the culprit vessel for the patient's non-STEMI Prox Cx lesion: A .    The Prox Cx lesion was 95% stenosed with 0% stenosis post-intervention.    The Prox RCA lesion was 100% stenosed.    The Mid LAD lesion was 80% stenosed.    The post-procedure left ventricular end diastolic pressure was 20.    Prox Cx lesion: A STENT SYNERGY XD [id number] stent  was successfully placed at 11 AURELIO for 15 sec.    The estimated blood loss was <50 mL.     Coronary angiogram:   Left main:  No significant stenosis  Lad:  Proximal 30-40% stenosis and mid 80% stenosis   Circumflex: Culprit vessel.  Proximal 95% stenosis.  Successful PCI with NORMAN   RCA:  Proximal .  Fills via bridging collaterals from proximal RCA to mid RCA as well as collaterals from septal branches of the LAD  Access: Right radial artery access   Assessment and plan   Continue aspirin and Plavix   Follow-up in Cardiology Clinic with Dr. Soliz to discuss PCI of LAD +/-  revascularization of RCA if needed versus coronary artery bypass grafting  Statin therapy with goal LDL less than 70    Patient instructions   Take aspirin 81 mg daily and atorvastatin 40 mg indefinitely,   Take Plavix 75 mg for 1 year, then re-evaluate need with Cardiology  Follow-up with PCP and Cardiology  You may need another stent placed, or CABG to address further blockages      Thank you for trusting [facility] and me with your care.  We are honored that you entrusted us with your healthcare needs. Your satisfaction is very important to us and we hope you have been very pleased with your experience at [facility]. After your discharge you may receive a survey asking you to rate your hospital experience- Please help us by completing this survey. We hope that you have received the very best care possible during your hospitalization at [facility], as your satisfaction is our top priority.    Let me know if there is anything more I can do!!              Hugh Agosto MD        Medical Director        Section Head of         Board-Certified IM Attending  Goals of Care Treatment Preferences:  Code Status: Full Code      Consults:   Consults (From admission, onward)          Status Ordering Provider     Inpatient consult to Cardiology  Once        Provider:  Srinivasan Colorado MD    Completed OSCAR ZHAO             No new Assessment & Plan notes have been filed under this hospital service since the last note was generated.  Service: Hospital Medicine    Final Active Diagnoses:    Diagnosis Date Noted POA    PRINCIPAL PROBLEM:  NSTEMI (non-ST elevated myocardial infarction) [I21.4] 03/22/2024 Yes    Smoking [F17.200] 03/22/2024 Yes      Problems Resolved During this Admission:       Discharged Condition: good    Disposition: Home or Self Care    Follow Up:   Follow-up Information       [facility] -. Schedule an appointment as soon as possible for a visit in 1 week(s).    Why: Out-Patient Primary Care Hospital Follow-up and to establish care; Cardiology Hospital Follow-up and to establish care.  Contact information:  [address] [address] [phone]                           Patient Instructions:      Ambulatory referral/consult to Internal Medicine   Standing Status: Future   Referral Priority: Routine Referral Type: Consultation   Referral Reason: Specialty Services Required   Requested Specialty: Internal Medicine   Number of Visits Requested: 1     Ambulatory referral/consult to Cardiology   Standing Status: Future   Referral Priority: Routine Referral Type: Consultation   Referral Reason: Specialty Services Required   Referred to Provider: IAN LAWRENCE Requested Specialty: Cardiology   Number of Visits Requested: 1     Cardiac rehab phase ii   Standing Status: Future Standing Exp. Date: 03/24/25     Order Specific Question Answer Comments   Department [facility] CARDIAC REHAB    Select qualifying diagnosis: Z98.61 - Coronary angioplasty status        Significant Diagnostic Studies:   Recent Results (from the past 100 hour(s))   CBC auto differential    Collection Time: 03/22/24  7:41 PM   Result Value Ref Range    WBC 7.95 3.90 - 12.70 K/uL    RBC 4.96 4.60 - 6.20 M/uL    Hemoglobin 15.0 14.0 - 18.0 g/dL    Hematocrit 45.5 40.0 - 54.0 %    MCV 92 82 - 98 fL    MCH 30.2 27.0 - 31.0 pg     MCHC 33.0 32.0 - 36.0 g/dL    RDW 13.1 11.5 - 14.5 %    Platelets 208 150 - 450 K/uL    MPV 9.5 9.2 - 12.9 fL    Immature Granulocytes 0.4 0.0 - 0.5 %    Gran # (ANC) 5.8 1.8 - 7.7 K/uL    Immature Grans (Abs) 0.03 0.00 - 0.04 K/uL    Lymph # 1.5 1.0 - 4.8 K/uL    Mono # 0.5 0.3 - 1.0 K/uL    Eos # 0.1 0.0 - 0.5 K/uL    Baso # 0.03 0.00 - 0.20 K/uL    nRBC 0 0 /100 WBC    Gran % 73.3 (H) 38.0 - 73.0 %    Lymph % 19.2 18.0 - 48.0 %    Mono % 5.9 4.0 - 15.0 %    Eosinophil % 0.8 0.0 - 8.0 %    Basophil % 0.4 0.0 - 1.9 %    Differential Method Automated    Comprehensive metabolic panel    Collection Time: 03/22/24  7:41 PM   Result Value Ref Range    Sodium 138 136 - 145 mmol/L    Potassium 4.1 3.5 - 5.1 mmol/L    Chloride 108 95 - 110 mmol/L    CO2 22 (L) 23 - 29 mmol/L    Glucose 114 (H) 70 - 110 mg/dL    BUN 8 8 - 23 mg/dL    Creatinine 0.9 0.5 - 1.4 mg/dL    Calcium 9.1 8.7 - 10.5 mg/dL    Total Protein 7.4 6.0 - 8.4 g/dL    Albumin 4.2 3.5 - 5.2 g/dL    Total Bilirubin 0.6 0.1 - 1.0 mg/dL    Alkaline Phosphatase 62 55 - 135 U/L    AST 65 (H) 10 - 40 U/L    ALT 23 10 - 44 U/L    eGFR >60 >60 mL/min/1.73 m^2    Anion Gap 8 8 - 16 mmol/L   Troponin I #1    Collection Time: 03/22/24  7:41 PM   Result Value Ref Range    Troponin I 1.471 (H) 0.000 - 0.026 ng/mL   B-Type natriuretic peptide (BNP)    Collection Time: 03/22/24  7:41 PM   Result Value Ref Range    BNP 48 0 - 99 pg/mL   APTT    Collection Time: 03/22/24  8:54 PM   Result Value Ref Range    aPTT 27.4 21.0 - 32.0 sec   Protime-INR    Collection Time: 03/22/24  8:54 PM   Result Value Ref Range    Prothrombin Time 11.0 9.0 - 12.5 sec    INR 1.0 0.8 - 1.2   Troponin I #2    Collection Time: 03/22/24 11:14 PM   Result Value Ref Range    Troponin I 5.212 (H) 0.000 - 0.026 ng/mL   APTT    Collection Time: 03/23/24  3:44 AM   Result Value Ref Range    aPTT 52.1 (H) 21.0 - 32.0 sec   CBC auto differential    Collection Time: 03/23/24  3:45 AM   Result Value Ref Range     WBC 10.35 3.90 - 12.70 K/uL    RBC 4.73 4.60 - 6.20 M/uL    Hemoglobin 14.5 14.0 - 18.0 g/dL    Hematocrit 43.1 40.0 - 54.0 %    MCV 91 82 - 98 fL    MCH 30.7 27.0 - 31.0 pg    MCHC 33.6 32.0 - 36.0 g/dL    RDW 13.0 11.5 - 14.5 %    Platelets 210 150 - 450 K/uL    MPV 9.4 9.2 - 12.9 fL    Immature Granulocytes 0.3 0.0 - 0.5 %    Gran # (ANC) 7.4 1.8 - 7.7 K/uL    Immature Grans (Abs) 0.03 0.00 - 0.04 K/uL    Lymph # 2.0 1.0 - 4.8 K/uL    Mono # 0.8 0.3 - 1.0 K/uL    Eos # 0.1 0.0 - 0.5 K/uL    Baso # 0.03 0.00 - 0.20 K/uL    nRBC 0 0 /100 WBC    Gran % 71.4 38.0 - 73.0 %    Lymph % 18.8 18.0 - 48.0 %    Mono % 7.9 4.0 - 15.0 %    Eosinophil % 1.3 0.0 - 8.0 %    Basophil % 0.3 0.0 - 1.9 %    Differential Method Automated    Lipid Panel    Collection Time: 03/23/24  3:45 AM   Result Value Ref Range    Cholesterol 210 (H) 120 - 199 mg/dL    Triglycerides 78 30 - 150 mg/dL    HDL 39 (L) 40 - 75 mg/dL    LDL Cholesterol 155.4 63.0 - 159.0 mg/dL    HDL/Cholesterol Ratio 18.6 (L) 20.0 - 50.0 %    Total Cholesterol/HDL Ratio 5.4 (H) 2.0 - 5.0    Non-HDL Cholesterol 171 mg/dL   Protime-INR    Collection Time: 03/23/24  3:45 AM   Result Value Ref Range    Prothrombin Time 11.1 9.0 - 12.5 sec    INR 1.0 0.8 - 1.2   Type & Screen    Collection Time: 03/23/24  3:45 AM   Result Value Ref Range    Group & Rh A POS     Indirect Kita NEG     Specimen Outdate 03/26/2024 23:59    Hemoglobin A1C    Collection Time: 03/23/24  3:45 AM   Result Value Ref Range    Hemoglobin A1C 5.4 4.0 - 5.6 %    Estimated Avg Glucose 108 68 - 131 mg/dL   Phosphorus    Collection Time: 03/23/24  3:45 AM   Result Value Ref Range    Phosphorus 2.5 (L) 2.7 - 4.5 mg/dL   Magnesium    Collection Time: 03/23/24  3:45 AM   Result Value Ref Range    Magnesium 2.2 1.6 - 2.6 mg/dL   Basic Metabolic Panel    Collection Time: 03/23/24  3:45 AM   Result Value Ref Range    Sodium 139 136 - 145 mmol/L    Potassium 3.5 3.5 - 5.1 mmol/L    Chloride 108 95 - 110  mmol/L    CO2 22 (L) 23 - 29 mmol/L    Glucose 108 70 - 110 mg/dL    BUN 8 8 - 23 mg/dL    Creatinine 0.8 0.5 - 1.4 mg/dL    Calcium 8.5 (L) 8.7 - 10.5 mg/dL    Anion Gap 9 8 - 16 mmol/L    eGFR >60 >60 mL/min/1.73 m^2   Troponin I    Collection Time: 03/23/24  3:45 AM   Result Value Ref Range    Troponin I 32.935 (H) 0.000 - 0.026 ng/mL   Echo    Collection Time: 03/23/24  8:20 AM   Result Value Ref Range    BSA 1.79 m2    LVOT stroke volume 60.93 cm3    LVIDd 4.35 3.5 - 6.0 cm    LV Systolic Volume 38.64 mL    LV Systolic Volume Index 22.1 mL/m2    LVIDs 3.12 2.1 - 4.0 cm    LV Diastolic Volume 85.32 mL    LV Diastolic Volume Index 48.75 mL/m2    IVS 1.41 (A) 0.6 - 1.1 cm    LVOT diameter 1.99 cm    LVOT area 3.1 cm2    FS 28 28 - 44 %    Left Ventricle Relative Wall Thickness 0.53 cm    Posterior Wall 1.16 (A) 0.6 - 1.1 cm    LV mass 207.82 g    LV Mass Index 119 g/m2    MV Peak E Homer 0.77 m/s    TDI LATERAL 0.06 m/s    TDI SEPTAL 0.05 m/s    E/E' ratio 14.00 m/s    MV Peak A Homer 0.82 m/s    TR Max Homer 2.35 m/s    E/A ratio 0.94     IVRT 102.76 msec    E wave deceleration time 252.84 msec    LV SEPTAL E/E' RATIO 15.40 m/s    LV LATERAL E/E' RATIO 12.83 m/s    LVOT peak homer 0.89 m/s    Left Ventricular Outflow Tract Mean Velocity 0.60 cm/s    Left Ventricular Outflow Tract Mean Gradient 1.65 mmHg    RVDD 3.70 cm    RV S' 12.74 cm/s    TAPSE 1.64 cm    LA size 3.44 cm    Left Atrium Minor Axis 5.26 cm    Left Atrium Major Axis 5.48 cm    RA Major Axis 4.99 cm    AV mean gradient 3 mmHg    AV peak gradient 5 mmHg    Ao peak homer 1.17 m/s    Ao VTI 24.10 cm    LVOT peak VTI 19.60 cm    AV valve area 2.53 cm²    AV Velocity Ratio 0.76     AV index (prosthetic) 0.81     EMILY by Velocity Ratio 2.36 cm²    MV mean gradient 1 mmHg    MV peak gradient 4 mmHg    MV stenosis pressure 1/2 time 73.32 ms    MV valve area p 1/2 method 3.00 cm2    MV valve area by continuity eq 1.95 cm2    MV VTI 31.2 cm    Triscuspid Valve  Regurgitation Peak Gradient 22 mmHg    PV PEAK VELOCITY 0.80 m/s    PV peak gradient 3 mmHg    Sinus 3.65 cm    STJ 2.56 cm    Ascending aorta 3.40 cm    IVC diameter 1.45 cm    Mean e' 0.06 m/s    ZLVIDS 0.32     ZLVIDD -1.07     LA Volume Index 33.2 mL/m2    LA volume 58.07 cm3    LA WIDTH 3.7 cm    RA Width 3.5 cm    TV resting pulmonary artery pressure 25 mmHg    RV TB RVSP 5 mmHg    Est. RA pres 3 mmHg   APTT    Collection Time: 03/23/24 10:04 AM   Result Value Ref Range    aPTT 47.4 (H) 21.0 - 32.0 sec   ISTAT ACT-K    Collection Time: 03/23/24  2:29 PM   Result Value Ref Range    POC ACTIVATED CLOTTING TIME K 163 (H) 74 - 137 sec    Sample ARTERIAL    ISTAT ACT-K    Collection Time: 03/23/24  2:39 PM   Result Value Ref Range    POC ACTIVATED CLOTTING TIME K 266 (H) 74 - 137 sec    Sample ARTERIAL    ISTAT ACT-K    Collection Time: 03/23/24  3:02 PM   Result Value Ref Range    POC ACTIVATED CLOTTING TIME K 266 (H) 74 - 137 sec    Sample ARTERIAL    POCT glucose    Collection Time: 03/23/24  8:47 PM   Result Value Ref Range    POCT Glucose 82 70 - 110 mg/dL   POCT glucose    Collection Time: 03/24/24  7:24 AM   Result Value Ref Range    POCT Glucose 86 70 - 110 mg/dL       Microbiology Results (last 7 days)       ** No results found for the last 168 hours. **            Imaging Results              X-Ray Chest PA And Lateral (Final result)  Result time 03/22/24 19:11:38      Final result by aTte Rowell MD (03/22/24 19:11:38)                   Impression:      No acute cardiopulmonary process identified.      Electronically signed by: Tate Rowell MD  Date:    03/22/2024  Time:    19:11               Narrative:    EXAMINATION:  XR CHEST PA AND LATERAL    CLINICAL HISTORY:  Chest pain, unspecified    TECHNIQUE:  PA and lateral views of the chest were performed.    COMPARISON:  None    FINDINGS:  Cardiac silhouette is normal in size.  Lungs are symmetrically expanded.  Minimal scarring is seen at the left  lung base.  No evidence of focal consolidative process, pneumothorax, or significant pleural effusion.  No acute osseous abnormality identified.                                          Pending Diagnostic Studies:       Procedure Component Value Units Date/Time    Basic Metabolic Panel [[id number]] Collected: 03/24/24 0954    Order Status: Sent Lab Status: In process Updated: 03/24/24 1006    Specimen: Blood     EKG 12-LEAD on arrival to floor [[id number]]     Order Status: Sent Lab Status: No result     EKG 12-lead [[id number]]     Order Status: Sent Lab Status: No result            Medications:  Reconciled Home Medications:      Medication List        START taking these medications      aspirin 81 MG Chew  Take 1 tablet (81 mg total) by mouth once daily.  Start taking on: March 25, 2024     atorvastatin 40 MG tablet  Commonly known as: LIPITOR  Take 1 tablet (40 mg total) by mouth every evening.     clopidogreL 75 mg tablet  Commonly known as: PLAVIX  Take 1 tablet (75 mg total) by mouth once daily.  Start taking on: March 25, 2024              Indwelling Lines/Drains at time of discharge:   Lines/Drains/Airways       None                   Time spent on the discharge of patient: 35 minutes         Hugh Agosto MD  Department of Hospital Medicine  [facility]

## 2024-03-24 NOTE — DISCHARGE INSTRUCTIONS
Take aspirin 81 mg daily and atorvastatin 40 mg indefinitely,   Take Plavix 75 mg for 1 year, then re-evaluate need with Cardiology  Follow-up with PCP and Cardiology  You may need another stent placed, or CABG to address further blockages      Thank you for trusting [facility] and me with your care.  We are honored that you entrusted us with your healthcare needs. Your satisfaction is very important to us and we hope you have been very pleased with your experience at [facility]. After your discharge you may receive a survey asking you to rate your hospital experience- Please help us by completing this survey. We hope that you have received the very best care possible during your hospitalization at [facility], as your satisfaction is our top priority.    Let me know if there is anything more I can do!!              Hugh Agosto MD        Medical Director        Section Head of         Board-Certified IM Attending      PATIENT GENERAL DISCHARGE INFORMATION   Things that YOU are responsible for to Manage Your Care At Home:  1. Getting your prescriptions filled.  2. Taking you medications as directed. (DO NOT MISS ANY DOSES!)  3. Going to your follow-up doctor appointments.                 *This is important because it allows the doctor to monitor your progress and make changes.      If you are unable to make your follow up appointments, please call the number listed and reschedule this appointment.   After discharge, if you need assistance, you can call [facility] for 24/7 assistance at [phone]  If you are experience any signs or symptoms, Call your doctor or Call 911 and come to your nearest Emergency Room.    You should receive a call from [facility] within 48-72 hours to help manage your care after discharge.   Please try to make sure that you answer your phone for this important phone call.

## 2024-03-24 NOTE — PROGRESS NOTES
[facility]  Cardiology  Progress Note    Patient Name: Jt Avila  MRN: [id number]  Admission Date: 3/22/2024  Hospital Length of Stay: 2 days  Code Status: Prior   Attending Physician: No att. providers found   Primary Care Physician: Unable, To Obtain  Expected Discharge Date: 3/24/2024  Principal Problem:NSTEMI (non-ST elevated myocardial infarction)    Subjective:       Interval History:  no complications from angiogram yesterday.  Doing fine.  Chest pain-free    History reviewed. No pertinent past medical history.    History reviewed. No pertinent surgical history.    Review of patient's allergies indicates:  No Known Allergies    No current facility-administered medications on file prior to encounter.     No current outpatient medications on file prior to encounter.     Family History    None       Tobacco Use    Smoking status: Not on file    Smokeless tobacco: Not on file   Substance and Sexual Activity    Alcohol use: Not on file    Drug use: Not on file    Sexual activity: Not on file     Review of Systems   Constitutional: Negative.   HENT: Negative.     Eyes: Negative.    Respiratory: Negative.     Endocrine: Negative.    Hematologic/Lymphatic: Negative.    Skin: Negative.    Musculoskeletal: Negative.    Gastrointestinal: Negative.    Genitourinary: Negative.    Neurological: Negative.    Psychiatric/Behavioral: Negative.     Allergic/Immunologic: Negative.      Objective:     Vital Signs (Most Recent):  Temp: 97.9 °F (36.6 °C) (03/24/24 0725)  Pulse: 63 (03/24/24 0725)  Resp: 19 (03/24/24 0725)  BP: 107/63 (03/24/24 0725)  SpO2: 95 % (03/24/24 0725) Vital Signs (24h Range):  Temp:  [97.9 °F (36.6 °C)-98.3 °F (36.8 °C)] 97.9 °F (36.6 °C)  Pulse:  [59-75] 63  Resp:  [16-27] 19  SpO2:  [94 %-96 %] 95 %  BP: ()/(57-72) 107/63     Weight: 68.1 kg (150 lb 2.1 oz)  Body mass index is 24.98 kg/m².    SpO2: 95 %         Intake/Output Summary (Last 24 hours) at 3/24/2024 1967  Last data filed at  3/24/2024 0944  Gross per 24 hour   Intake 120 ml   Output 300 ml   Net -180 ml         Lines/Drains/Airways       None                    Physical Exam  Vitals reviewed.   Constitutional:       Appearance: He is well-developed.   HENT:      Head: Normocephalic.   Eyes:      Conjunctiva/sclera: Conjunctivae normal.      Pupils: Pupils are equal, round, and reactive to light.   Cardiovascular:      Rate and Rhythm: Normal rate and regular rhythm.      Heart sounds: Normal heart sounds.   Pulmonary:      Effort: Pulmonary effort is normal.      Breath sounds: Normal breath sounds.   Abdominal:      General: Bowel sounds are normal.      Palpations: Abdomen is soft.   Musculoskeletal:      Cervical back: Normal range of motion and neck supple.   Skin:     General: Skin is warm.   Neurological:      Mental Status: He is alert and oriented to person, place, and time.          Significant Labs:     DATA:     Laboratory:  CBC:  Recent Labs   Lab 03/22/24 1941 03/23/24  0345   WBC 7.95 10.35   Hemoglobin 15.0 14.5   Hematocrit 45.5 43.1   Platelets 208 210         CHEMISTRIES:  Recent Labs   Lab 03/22/24 1941 03/23/24 0345 03/24/24  0954   Glucose 114 H 108 126 H   Sodium 138 139 141   Potassium 4.1 3.5 3.0 L   BUN 8 8 9   Creatinine 0.9 0.8 0.8   Calcium 9.1 8.5 L 7.9 L   Magnesium  --  2.2  --          CARDIAC BIOMARKERS:  Recent Labs   Lab 03/22/24 1941 03/22/24  2314 03/23/24  0345   Troponin I 1.471 H 5.212 H 32.935 H         COAGS:  Recent Labs   Lab 03/22/24 2054 03/23/24  0345   INR 1.0 1.0         LIPIDS/LFTS:  Recent Labs   Lab 03/22/24 1941 03/23/24  0345   Cholesterol  --  210 H   Triglycerides  --  78   HDL  --  39 L   LDL Cholesterol  --  155.4   Non-HDL Cholesterol  --  171   AST 65 H  --    ALT 23  --          Hemoglobin A1C   Date Value Ref Range Status   03/23/2024 5.4 4.0 - 5.6 % Final     Comment:     ADA Screening Guidelines:  5.7-6.4%  Consistent with prediabetes  >or=6.5%  Consistent with  diabetes    High levels of fetal hemoglobin interfere with the HbA1C  assay. Heterozygous hemoglobin variants (HbS, HgC, etc)do  not significantly interfere with this assay.   However, presence of multiple variants may affect accuracy.         TSH        The ASCVD Risk score (Kareem VERAS, et al., 2019) failed to calculate for the following reasons:    The patient has a prior MI or stroke diagnosis       BNP    Lab Results   Component Value Date/Time    BNP 48 03/22/2024 07:41 PM            ECHO    Results for orders placed during the hospital encounter of 03/22/24    Echo    Interpretation Summary    Left Ventricle: The left ventricle is normal in size. Mildly increased wall thickness. Septal thickening. There is concentric hypertrophy. There is normal systolic function with a visually estimated ejection fraction of 55 - 60%. Grade I diastolic dysfunction.    Right Ventricle: Normal right ventricular cavity size. Systolic function is normal.    Left Atrium: Left atrium is moderately dilated.    Right Atrium: Right atrium is mildly dilated.    Mitral Valve: There is mild regurgitation.    Tricuspid Valve: There is mild regurgitation.    Pulmonary Artery: The estimated pulmonary artery systolic pressure is 25 mmHg.    IVC/SVC: Normal venous pressure at 3 mmHg.      STRESS TEST    No results found for this or any previous visit.        CATH    Results for orders placed during the hospital encounter of 03/22/24    Cardiac catheterization    Conclusion    There was three vessel coronary artery disease.    Successful IVUS guided PCI of 95% stenosis in proximal circumflex with NORMAN x1.  This was the culprit vessel for the patient's non-STEMI Prox Cx lesion: A .    The Prox Cx lesion was 95% stenosed with 0% stenosis post-intervention.    The Prox RCA lesion was 100% stenosed.    The Mid LAD lesion was 80% stenosed.    The post-procedure left ventricular end diastolic pressure was 20.    Prox Cx lesion: A STENT SYNERGY XD  3.0X20MM stent was successfully placed at 11 AURELIO for 15 sec.    The estimated blood loss was <50 mL.    Coronary angiogram:    Left main:  No significant stenosis    Lad:  Proximal 30-40% stenosis and mid 80% stenosis    Circumflex: Culprit vessel.  Proximal 95% stenosis.  Successful PCI with NORMAN    RCA:  Proximal .  Fills via bridging collaterals from proximal RCA to mid RCA as well as collaterals from septal branches of the LAD    Access: Right radial artery access    Assessment and plan    Continue aspirin and Plavix    Follow-up in Cardiology Clinic with Dr. Soliz to discuss PCI of LAD +/-  revascularization of RCA if needed versus coronary artery bypass grafting    Statin therapy with goal LDL less than 70                The procedure log was documented by Documenter: Sumeet Barreto RN and verified by Srinivasan Colorado MD.    Date: 3/23/2024  Time: 3:09 PM      Assessment and Plan:     Brief HPI:     * NSTEMI (non-ST elevated myocardial infarction)  Non-STEMI.  Aspirin Plavix heparin drip.  Cardiac catheterization    Risks, benefits and alternatives of the catheterization procedure were discussed with the patient.The risks of coronary angiography include but are not limited to: bleeding, infection, death, heart attack, arrhythmia, kidney injury or failure, potential need for dialysis, allergic reactions, stroke, need for emergency surgery, hematoma, pseudoaneurysm etc.  Should stenting be indicated, the patient has agreed to dual anti-platelet therapy for 1-consecutive year with a drug-eluting stent and a minimum of 1-month with the use of a bare metal stent. Additionally, pt is aware that non-compliance is likely to result in stent clotting with heart attack, heart failure, and/or death  The risks of moderate sedation include hypotension, respiratory depression, arrhythmias, bronchospasm, and death. Informed consent was obtained and the  patient is agreeable to proceed with the procedure. Consent was  placed on the chart.      3/24:  status post PCI of circumflex yesterday.  Follow-up in cardiology clinic to discuss revascularization of LAD and RCA .  Continue aspirin 81 mg daily  indefinitely and Plavix 70 mg daily uninterrupted for at least 1 year and longer if no contraindications.  Statins with goal LDL less than 70    Smoking            VTE Risk Mitigation (From admission, onward)           Ordered     IP VTE LOW RISK PATIENT  Once         03/22/24 6016                    Srinivasan Colorado MD  Cardiology  [facility]

## 2024-03-24 NOTE — PLAN OF CARE
Case Management Final Discharge Note    Discharge Disposition: None  New DME ordered/Company name: None  Relevant SDOH/Transition of Care Barriers:  None  Primary Caretaker and contact information: None  Scheduled followup appointment: Into AVS  Referrals placed: None  Transportation: Family    Patient and family educated on discharge services and updated on DC plan. Bedside RN notified, patient clear to discharge from Case Management Perspective.

## 2024-03-24 NOTE — PLAN OF CARE
Patient is ready and clear for discharge from Case Management's perspective; informed patient's nurse, Antwan. Discussed and provided patient with written discharge instruction and education. Patient will need to schedule with

## 2024-03-24 NOTE — NURSING TRANSFER
Nursing Transfer Note      3/23/2024   9:06 PM    Nurse giving handoff: ANETTE Quesada  Nurse receiving handoff:ANETTE Ortega    Reason patient is being transferred: stable for floor    Transfer To: 309 from     Transfer via bed    Transfer with cardiac monitoring    Transported by RN and transport      4eyes on Skin: yes    Medicines sent: N/A    Any special needs or follow-up needed: N/A    Patient belongings transferred with patient:  belonging were already in 309    Chart send with patient: Yes    Notified: Significant other at bedside    Patient reassessed at: 2045, 3/23/24     Upon arrival to floor: cardiac monitor applied, patient oriented to room, call bell in reach, and bed in lowest position    Pt and oriented. HR running NSR on monitor. BP stable. On RA. Normothermic. NS infusing at 100ml/hr. TR band removed by previous shift, gauze and transplant dressing in place. Pt and significant other updated on plan of care. No new falls, injuries, or skin breakdown at this time

## 2024-03-24 NOTE — PLAN OF CARE
03/24/24 0957   Post-Acute Status   Post-Acute Authorization Other  (Home; follow-ups)   Other Status No Post-Acute Service Needs   Hospital Resources/Appts/Education Provided Provided patient/caregiver with written discharge plan information;Provided education on problems/symptoms using teachback;Appointments scheduled and added to AVS;Post-Acute resouces added to AVS   Discharge Delays None known at this time   Discharge Plan   Discharge Plan A Home with family  (Follow-ups)   Discharge Plan B Home with family  (Follow-ups)

## 2024-03-25 ENCOUNTER — TELEPHONE (OUTPATIENT)
Dept: CARDIOLOGY | Facility: CLINIC | Age: 64
End: 2024-03-25

## 2024-03-25 NOTE — TELEPHONE ENCOUNTER
Spoke with pt wife on today pt follow up appointment is scheduled.  Pt is aware of follow up appointment.

## 2024-03-25 NOTE — TELEPHONE ENCOUNTER
----- Message from Srinivasan Colorado MD sent at 3/23/2024  3:38 PM CDT -----  Please make an appointment for patient to see me in Cardiology Clinic.

## 2024-03-28 LAB
OHS QRS DURATION: 92 MS
OHS QRS DURATION: 94 MS
OHS QTC CALCULATION: 398 MS
OHS QTC CALCULATION: 432 MS

## 2024-04-19 ENCOUNTER — OFFICE VISIT (OUTPATIENT)
Dept: CARDIOLOGY | Facility: CLINIC | Age: 64
End: 2024-04-19
Payer: COMMERCIAL

## 2024-04-19 VITALS
DIASTOLIC BLOOD PRESSURE: 64 MMHG | HEIGHT: 65 IN | RESPIRATION RATE: 18 BRPM | WEIGHT: 153.69 LBS | HEART RATE: 70 BPM | OXYGEN SATURATION: 97 % | SYSTOLIC BLOOD PRESSURE: 116 MMHG | BODY MASS INDEX: 25.61 KG/M2

## 2024-04-19 DIAGNOSIS — I25.10 CORONARY ARTERY DISEASE INVOLVING NATIVE CORONARY ARTERY OF NATIVE HEART, UNSPECIFIED WHETHER ANGINA PRESENT: Primary | ICD-10-CM

## 2024-04-19 DIAGNOSIS — I25.10 CAD (CORONARY ARTERY DISEASE): ICD-10-CM

## 2024-04-19 DIAGNOSIS — I21.4 NSTEMI (NON-ST ELEVATED MYOCARDIAL INFARCTION): ICD-10-CM

## 2024-04-19 PROCEDURE — 3078F DIAST BP <80 MM HG: CPT | Mod: CPTII,S$GLB,, | Performed by: INTERNAL MEDICINE

## 2024-04-19 PROCEDURE — 99999 PR PBB SHADOW E&M-EST. PATIENT-LVL III: CPT | Mod: PBBFAC,,, | Performed by: INTERNAL MEDICINE

## 2024-04-19 PROCEDURE — 3044F HG A1C LEVEL LT 7.0%: CPT | Mod: CPTII,S$GLB,, | Performed by: INTERNAL MEDICINE

## 2024-04-19 PROCEDURE — 3008F BODY MASS INDEX DOCD: CPT | Mod: CPTII,S$GLB,, | Performed by: INTERNAL MEDICINE

## 2024-04-19 PROCEDURE — 1159F MED LIST DOCD IN RCRD: CPT | Mod: CPTII,S$GLB,, | Performed by: INTERNAL MEDICINE

## 2024-04-19 PROCEDURE — 99215 OFFICE O/P EST HI 40 MIN: CPT | Mod: S$GLB,,, | Performed by: INTERNAL MEDICINE

## 2024-04-19 PROCEDURE — 3074F SYST BP LT 130 MM HG: CPT | Mod: CPTII,S$GLB,, | Performed by: INTERNAL MEDICINE

## 2024-04-19 PROCEDURE — 1111F DSCHRG MED/CURRENT MED MERGE: CPT | Mod: CPTII,S$GLB,, | Performed by: INTERNAL MEDICINE

## 2024-04-19 RX ORDER — SODIUM CHLORIDE 9 MG/ML
INJECTION, SOLUTION INTRAVENOUS CONTINUOUS
Status: CANCELLED | OUTPATIENT
Start: 2024-04-19

## 2024-04-19 RX ORDER — DIPHENHYDRAMINE HCL 50 MG
50 CAPSULE ORAL ONCE
Status: CANCELLED | OUTPATIENT
Start: 2024-04-19 | End: 2024-04-19

## 2024-04-19 RX ORDER — METOPROLOL SUCCINATE 25 MG/1
25 TABLET, EXTENDED RELEASE ORAL DAILY
Qty: 90 TABLET | Refills: 3 | Status: SHIPPED | OUTPATIENT
Start: 2024-04-19

## 2024-04-19 RX ORDER — NITROGLYCERIN 0.4 MG/1
0.4 TABLET SUBLINGUAL EVERY 5 MIN PRN
Qty: 30 TABLET | Refills: 12 | Status: SHIPPED | OUTPATIENT
Start: 2024-04-19 | End: 2024-05-19

## 2024-04-19 NOTE — PROGRESS NOTES
CARDIOVASCULAR CONSULTATION    REASON FOR CONSULT:   Jt Avila is a 64 y.o. male who presents for   Chief Complaint   Patient presents with    Follow-up     HISTORY OF PRESENT ILLNESS:     Patient is a pleasant 64-year-old man.  Recently came to the hospital and underwent PCI of circumflex for myocardial infarction.  Chest pain much better now, occasionally gets chest pain on exertion.  Lifestyle limiting per patient.  Has residual  of his RCA as well as LAD disease.  I had a detailed discussion with the patient about the risks benefits of coronary artery bypass grafting with LIMA to LAD and SVG to RCA (assuming it is possible because his PDA and PLB are small diffusely diseased vessels and might not be good targets for a vein graft) versus PCI of LAD and subsequently RCA  revascularization if patient continues to have lifestyle limiting angina.  Had a detailed discussion with the patient and his family.  After detailed discussion patient stated that he does not want to go see CT surgery and is not interested in CT surgery at all and would like to proceed PCI of LAD at the current time    Results for orders placed or performed during the hospital encounter of 03/22/24   EKG 12-lead    Collection Time: 03/23/24  8:14 AM   Result Value Ref Range    QRS Duration 92 ms    [facility] QTC Calculation 398 ms    Narrative    Test Reason : troponin 32    Vent. Rate : 058 BPM     Atrial Rate : 058 BPM     P-R Int : 136 ms          QRS Dur : 092 ms      QT Int : 406 ms       P-R-T Axes : 039 -14 028 degrees     QTc Int : 398 ms    Sinus bradycardia  Possible Inferior infarct (cited on or before 22-MAR-2024)  Cannot rule out Anterior infarct ,age undetermined  Abnormal ECG  When compared with ECG of 22-MAR-2024 18:52,  Significant changes have occurred  Confirmed by Srinivasan Colorado MD ([id number]) on 3/28/2024 11:15:43 PM    Referred By: [email or web address]   SELF           Confirmed By:Srinivasan Colorado MD          ECHO    Results for orders  placed during the hospital encounter of 03/22/24    Echo    Interpretation Summary    Left Ventricle: The left ventricle is normal in size. Mildly increased wall thickness. Septal thickening. There is concentric hypertrophy. There is normal systolic function with a visually estimated ejection fraction of 55 - 60%. Grade I diastolic dysfunction.    Right Ventricle: Normal right ventricular cavity size. Systolic function is normal.    Left Atrium: Left atrium is moderately dilated.    Right Atrium: Right atrium is mildly dilated.    Mitral Valve: There is mild regurgitation.    Tricuspid Valve: There is mild regurgitation.    Pulmonary Artery: The estimated pulmonary artery systolic pressure is 25 mmHg.    IVC/SVC: Normal venous pressure at 3 mmHg.      STRESS TEST    No results found for this or any previous visit.        CATH    Results for orders placed during the hospital encounter of 03/22/24    Cardiac catheterization    Conclusion    There was three vessel coronary artery disease.    Successful IVUS guided PCI of 95% stenosis in proximal circumflex with NORMAN x1.  This was the culprit vessel for the patient's non-STEMI Prox Cx lesion: A .    The Prox Cx lesion was 95% stenosed with 0% stenosis post-intervention.    The Prox RCA lesion was 100% stenosed.    The Mid LAD lesion was 80% stenosed.    The post-procedure left ventricular end diastolic pressure was 20.    Prox Cx lesion: A STENT SYNERGY XD 3.0X20MM stent was successfully placed at 11 AURELIO for 15 sec.    The estimated blood loss was <50 mL.    Coronary angiogram:    Left main:  No significant stenosis    Lad:  Proximal 30-40% stenosis and mid 80% stenosis    Circumflex: Culprit vessel.  Proximal 95% stenosis.  Successful PCI with NORMAN    RCA:  Proximal .  Fills via bridging collaterals from proximal RCA to mid RCA as well as collaterals from septal branches of the LAD    Access: Right radial artery access    Assessment and plan    Continue aspirin and  Plavix    Follow-up in Cardiology Clinic with Dr. Soliz to discuss PCI of LAD +/-  revascularization of RCA if needed versus coronary artery bypass grafting    Statin therapy with goal LDL less than 70                The procedure log was documented by Documenter: Sumeet Barreto RN and verified by Srinivasan Colorado MD.    Date: 3/23/2024  Time: 3:09 PM      PAST MEDICAL HISTORY:   No past medical history on file.    PAST SURGICAL HISTORY:     Past Surgical History:   Procedure Laterality Date    IVUS, CORONARY  3/23/2024    Procedure: IVUS, Coronary;  Surgeon: Srinivasan Colorado MD;  Location: [facility] CATH LAB;  Service: Cardiology;;    LEFT HEART CATHETERIZATION Left 3/23/2024    Procedure: Left heart cath;  Surgeon: Srinivasan Colorado MD;  Location: [facility] CATH LAB;  Service: Cardiology;  Laterality: Left;    PERCUTANEOUS TRANSLUMINAL BALLOON ANGIOPLASTY OF CORONARY ARTERY  3/23/2024    Procedure: Angioplasty-coronary;  Surgeon: Srinivasan Colorado MD;  Location: [facility] CATH LAB;  Service: Cardiology;;    STENT, DRUG ELUTING, SINGLE VESSEL, CORONARY  3/23/2024    Procedure: Stent, Drug Eluting, Single Vessel, Coronary;  Surgeon: Srinivasan Colorado MD;  Location: [facility] CATH LAB;  Service: Cardiology;;           SOCIAL HISTORY:     Social History     Socioeconomic History    Marital status:        FAMILY HISTORY:   No family history on file.    REVIEW OF SYSTEMS:   Review of Systems   Constitutional: Negative.   HENT: Negative.     Eyes: Negative.    Cardiovascular:  Positive for chest pain.   Respiratory: Negative.     Endocrine: Negative.    Hematologic/Lymphatic: Negative.    Skin: Negative.    Musculoskeletal: Negative.    Gastrointestinal: Negative.    Genitourinary: Negative.    Neurological: Negative.    Psychiatric/Behavioral: Negative.     Allergic/Immunologic: Negative.        A 10 point review of systems was performed and all the pertinent positives have been mentioned. Rest of review of systems was  "negative.        PHYSICAL EXAM:     Vitals:    04/19/24 0957   BP: 116/64   Pulse: 70   Resp: 18    Body mass index is 25.57 kg/m².  Weight: 69.7 kg (153 lb 10.6 oz)   Height: 5' 5" (165.1 cm)     Physical Exam  Vitals reviewed.   Constitutional:       Appearance: He is well-developed.   HENT:      Head: Normocephalic.   Eyes:      Conjunctiva/sclera: Conjunctivae normal.      Pupils: Pupils are equal, round, and reactive to light.   Cardiovascular:      Rate and Rhythm: Normal rate and regular rhythm.      Heart sounds: Normal heart sounds.   Pulmonary:      Effort: Pulmonary effort is normal.      Breath sounds: Normal breath sounds.   Abdominal:      General: Bowel sounds are normal.      Palpations: Abdomen is soft.   Musculoskeletal:      Cervical back: Normal range of motion and neck supple.   Skin:     General: Skin is warm.   Neurological:      Mental Status: He is alert and oriented to person, place, and time.           DATA:     Laboratory:  CBC:  Recent Labs   Lab 03/22/24 1941 03/23/24  0345   WBC 7.95 10.35   Hemoglobin 15.0 14.5   Hematocrit 45.5 43.1   Platelets 208 210       CHEMISTRIES:  Recent Labs   Lab 03/22/24 1941 03/23/24  0345 03/24/24  0954   Glucose 114 H 108 126 H   Sodium 138 139 141   Potassium 4.1 3.5 3.0 L   BUN 8 8 9   Creatinine 0.9 0.8 0.8   Calcium 9.1 8.5 L 7.9 L   Magnesium  --  2.2  --        CARDIAC BIOMARKERS:  Recent Labs   Lab 03/22/24 1941 03/22/24  2314 03/23/24  0345   Troponin I 1.471 H 5.212 H 32.935 H       COAGS:  Recent Labs   Lab 03/22/24 2054 03/23/24  0345   INR 1.0 1.0       LIPIDS/LFTS:  Recent Labs   Lab 03/22/24 1941 03/23/24  0345   Cholesterol  --  210 H   Triglycerides  --  78   HDL  --  39 L   LDL Cholesterol  --  155.4   Non-HDL Cholesterol  --  171   AST 65 H  --    ALT 23  --        Hemoglobin A1C   Date Value Ref Range Status   03/23/2024 5.4 4.0 - 5.6 % Final     Comment:     ADA Screening Guidelines:  5.7-6.4%  Consistent with " prediabetes  >or=6.5%  Consistent with diabetes    High levels of fetal hemoglobin interfere with the HbA1C  assay. Heterozygous hemoglobin variants (HbS, HgC, etc)do  not significantly interfere with this assay.   However, presence of multiple variants may affect accuracy.         TSH        The ASCVD Risk score (Kareem DK, et al., 2019) failed to calculate for the following reasons:    The patient has a prior MI or stroke diagnosis       BNP    Lab Results   Component Value Date/Time    BNP 48 03/22/2024 07:41 PM         ASSESSMENT AND PLAN     Patient Active Problem List   Diagnosis    NSTEMI (non-ST elevated myocardial infarction)    Smoking         ALLERGIES AND MEDICATION:   Review of patient's allergies indicates:  No Known Allergies     Medication List            Accurate as of April 19, 2024 10:47 AM. If you have any questions, ask your nurse or doctor.                START taking these medications      metoprolol succinate 25 MG 24 hr tablet  Commonly known as: TOPROL-XL  Take 1 tablet (25 mg total) by mouth once daily.  Started by: Srinivasan Colorado MD     nitroGLYCERIN 0.4 MG SL tablet  Commonly known as: NITROSTAT  Place 1 tablet (0.4 mg total) under the tongue every 5 (five) minutes as needed for Chest pain.  Started by: Srinivasan Colorado MD            CONTINUE taking these medications      aspirin 81 MG Chew  Take 1 tablet (81 mg total) by mouth once daily.     atorvastatin 40 MG tablet  Commonly known as: LIPITOR  Take 1 tablet (40 mg total) by mouth every evening.     clopidogreL 75 mg tablet  Commonly known as: PLAVIX  Take 1 tablet (75 mg total) by mouth once daily.               Where to Get Your Medications        These medications were sent to [id number] - [place] - [address]  [address] [place] [address]      Phone: [phone]   metoprolol succinate 25 MG 24 hr tablet  nitroGLYCERIN 0.4 MG SL tablet         Orders Placed This Encounter   Procedures    Clip  and Prep Right Radial, Right Groin, Left Groin    Ankle Brachial Indices (CELESTINE)     Coronary artery disease status post PCI of circumflex.  Continue aspirin and Plavix.  Continue statins with goal LDL less than 70.  Because of continued lifestyle limiting angina, will proceed with PCI of LAD.  Nitroglycerin p.r.n..  Toprol-XL    Risks, benefits and alternatives of the catheterization procedure were discussed with the patient.The risks of coronary angiography include but are not limited to: bleeding, infection, death, heart attack, arrhythmia, kidney injury or failure, potential need for dialysis, allergic reactions, stroke, need for emergency surgery, hematoma, pseudoaneurysm etc.  Should stenting be indicated, the patient has agreed to dual anti-platelet therapy for 1-consecutive year with a drug-eluting stent and a minimum of 1-month with the use of a bare metal stent. Additionally, pt is aware that non-compliance is likely to result in stent clotting with heart attack, heart failure, and/or death  The risks of moderate sedation include hypotension, respiratory depression, arrhythmias, bronchospasm, and death. Informed consent was obtained and the  patient is agreeable to proceed with the procedure. Consent was placed on the chart.    Dyslipidemia:  Continue Lipitor.  Goal LDL is less than 70    Lab Results   Component Value Date    LDLCALC 155.4 03/23/2024       Follow-up after testing      Thank you very much for involving me in the care of your patient.  Please do not hesitate to contact me if there are any questions.      Srinivasan Colorado MD, FAC, [facility]  Interventional Cardiologist, [facility].           This note was dictated with the help of speech recognition software.  There might be un-intended errors and/or substitutions.

## 2024-05-16 ENCOUNTER — HOSPITAL ENCOUNTER (OUTPATIENT)
Dept: PREADMISSION TESTING | Facility: HOSPITAL | Age: 64
Discharge: HOME | End: 2024-05-16
Attending: INTERNAL MEDICINE
Payer: COMMERCIAL

## 2024-05-16 VITALS
TEMPERATURE: 97 F | WEIGHT: 156 LBS | HEART RATE: 65 BPM | RESPIRATION RATE: 18 BRPM | HEIGHT: 65 IN | OXYGEN SATURATION: 97 % | BODY MASS INDEX: 25.99 KG/M2 | DIASTOLIC BLOOD PRESSURE: 67 MMHG | SYSTOLIC BLOOD PRESSURE: 118 MMHG

## 2024-05-16 DIAGNOSIS — I25.10 CAD (CORONARY ARTERY DISEASE): ICD-10-CM

## 2024-05-16 DIAGNOSIS — I25.10 CORONARY ARTERY DISEASE INVOLVING NATIVE CORONARY ARTERY OF NATIVE HEART, UNSPECIFIED WHETHER ANGINA PRESENT: ICD-10-CM

## 2024-05-16 LAB
ANION GAP SERPL CALC-SCNC: 8 MMOL/L (ref 8–16)
BASOPHILS # BLD AUTO: 0.04 K/UL (ref 0–0.2)
BASOPHILS NFR BLD: 0.6 % (ref 0–1.9)
BUN SERPL-MCNC: 8 MG/DL (ref 8–23)
CALCIUM SERPL-MCNC: 8.7 MG/DL (ref 8.7–10.5)
CHLORIDE SERPL-SCNC: 113 MMOL/L (ref 95–110)
CO2 SERPL-SCNC: 22 MMOL/L (ref 23–29)
CREAT SERPL-MCNC: 0.9 MG/DL (ref 0.5–1.4)
DIFFERENTIAL METHOD BLD: ABNORMAL
EOSINOPHIL # BLD AUTO: 0.8 K/UL (ref 0–0.5)
EOSINOPHIL NFR BLD: 11.4 % (ref 0–8)
ERYTHROCYTE [DISTWIDTH] IN BLOOD BY AUTOMATED COUNT: 13.5 % (ref 11.5–14.5)
EST. GFR  (NO RACE VARIABLE): >60 ML/MIN/1.73 M^2
GLUCOSE SERPL-MCNC: 91 MG/DL (ref 70–110)
HCT VFR BLD AUTO: 41 % (ref 40–54)
HGB BLD-MCNC: 13.5 G/DL (ref 14–18)
IMM GRANULOCYTES # BLD AUTO: 0.01 K/UL (ref 0–0.04)
IMM GRANULOCYTES NFR BLD AUTO: 0.1 % (ref 0–0.5)
LYMPHOCYTES # BLD AUTO: 1.9 K/UL (ref 1–4.8)
LYMPHOCYTES NFR BLD: 27.6 % (ref 18–48)
MCH RBC QN AUTO: 29.2 PG (ref 27–31)
MCHC RBC AUTO-ENTMCNC: 32.9 G/DL (ref 32–36)
MCV RBC AUTO: 89 FL (ref 82–98)
MONOCYTES # BLD AUTO: 0.6 K/UL (ref 0.3–1)
MONOCYTES NFR BLD: 8.7 % (ref 4–15)
NEUTROPHILS # BLD AUTO: 3.6 K/UL (ref 1.8–7.7)
NEUTROPHILS NFR BLD: 51.6 % (ref 38–73)
NRBC BLD-RTO: 0 /100 WBC
OHS QRS DURATION: 98 MS
OHS QTC CALCULATION: 420 MS
PLATELET # BLD AUTO: 193 K/UL (ref 150–450)
PMV BLD AUTO: 9.9 FL (ref 9.2–12.9)
POTASSIUM SERPL-SCNC: 3.4 MMOL/L (ref 3.5–5.1)
RBC # BLD AUTO: 4.63 M/UL (ref 4.6–6.2)
SODIUM SERPL-SCNC: 143 MMOL/L (ref 136–145)
WBC # BLD AUTO: 7 K/UL (ref 3.9–12.7)

## 2024-05-16 PROCEDURE — 93010 ELECTROCARDIOGRAM REPORT: CPT | Mod: ,,, | Performed by: INTERNAL MEDICINE

## 2024-05-16 PROCEDURE — 93005 ELECTROCARDIOGRAM TRACING: CPT

## 2024-05-16 PROCEDURE — 80048 BASIC METABOLIC PNL TOTAL CA: CPT | Performed by: INTERNAL MEDICINE

## 2024-05-16 PROCEDURE — 85025 COMPLETE CBC W/AUTO DIFF WBC: CPT | Performed by: INTERNAL MEDICINE

## 2024-05-16 NOTE — DISCHARGE INSTRUCTIONS
YOUR PROCEDURE WILL BE AT [facility] at [address] [place] [address]                              Before 7 AM, enter through the Emergency Entrance..   After 7 AM enter through the Main Entrance.                      Report to the Same Day Surgery Registration Desk in the hallway.(Just beside the Same Day Surgery Unit)      Your procedure  is scheduled for __5/22/2024_______________.    Call [phone] between 2pm and 5pm on _5/21/2024______to find out your arrival time for the day of surgery.    You may have two visitors.  No children under 12 years old.      You will be going to the Same Day Surgery Unit on the 2nd floor of the hospital.    Important instructions:  Do not eat anything after midnight.  You may have plain water, non carbonated.  You may also have Gatorade or Powerade after midnight.    Stop all fluids 2 hours before your surgery.    It is okay to brush your teeth.  Do not have gum, candy or mints.    Please shower the night before and the morning of your surgery.        Use Chlorhexidine soap as instructed by your pre op nurse.   Please place clean linens on your bed the night before surgery. Please wear fresh clean clothing after each shower.    No shaving of procedural area at least 4-5 days before surgery due to increased risk of skin irritation and/or possible infection.    Contact lenses and removable denture work may not be worn during your procedure.    You may wear deodorant only.     Do not wear powder, body lotion, perfume/cologne or make-up.    Do not wear any jewelry or have any metal on your body.    You will be asked to remove any dentures or partials for the procedure.    If you are going home on the same day of surgery, you must arrange for a family member or a friend to drive you home.  Public transportation is prohibited.  You will not be able to drive home if you were given anesthesia or sedation.    Please leave money and valuables  home.      You may bring your cell phone.    Call the doctor if fever or illness should occur before your surgery.    Call [phone] to contact us here if needed.

## 2024-05-21 ENCOUNTER — TELEPHONE (OUTPATIENT)
Dept: SURGERY | Facility: HOSPITAL | Age: 64
End: 2024-05-21
Payer: COMMERCIAL

## 2024-05-22 ENCOUNTER — HOSPITAL ENCOUNTER (OUTPATIENT)
Facility: HOSPITAL | Age: 64
Discharge: HOME | End: 2024-05-22
Attending: INTERNAL MEDICINE | Admitting: INTERNAL MEDICINE
Payer: COMMERCIAL

## 2024-05-22 VITALS
BODY MASS INDEX: 25.96 KG/M2 | RESPIRATION RATE: 14 BRPM | OXYGEN SATURATION: 98 % | TEMPERATURE: 98 F | HEART RATE: 61 BPM | SYSTOLIC BLOOD PRESSURE: 121 MMHG | WEIGHT: 156 LBS | DIASTOLIC BLOOD PRESSURE: 66 MMHG

## 2024-05-22 DIAGNOSIS — F17.200 SMOKING: ICD-10-CM

## 2024-05-22 DIAGNOSIS — I25.10 CAD (CORONARY ARTERY DISEASE): ICD-10-CM

## 2024-05-22 DIAGNOSIS — I25.10 CORONARY ARTERY DISEASE INVOLVING NATIVE CORONARY ARTERY OF NATIVE HEART, UNSPECIFIED WHETHER ANGINA PRESENT: ICD-10-CM

## 2024-05-22 DIAGNOSIS — I21.4 NSTEMI (NON-ST ELEVATED MYOCARDIAL INFARCTION): Primary | ICD-10-CM

## 2024-05-22 LAB
ALLENS TEST: ABNORMAL
ALLENS TEST: ABNORMAL
POC ACTIVATED CLOTTING TIME K: 261 SEC (ref 74–137)
POC ACTIVATED CLOTTING TIME K: 395 SEC (ref 74–137)
SAMPLE: ABNORMAL
SAMPLE: ABNORMAL
SITE: ABNORMAL
SITE: ABNORMAL

## 2024-05-22 PROCEDURE — C9600 PERC DRUG-EL COR STENT SING: HCPCS | Mod: LD | Performed by: INTERNAL MEDICINE

## 2024-05-22 PROCEDURE — 99152 MOD SED SAME PHYS/QHP 5/>YRS: CPT | Performed by: INTERNAL MEDICINE

## 2024-05-22 PROCEDURE — C1769 GUIDE WIRE: HCPCS | Performed by: INTERNAL MEDICINE

## 2024-05-22 PROCEDURE — 99152 MOD SED SAME PHYS/QHP 5/>YRS: CPT | Mod: ,,, | Performed by: INTERNAL MEDICINE

## 2024-05-22 PROCEDURE — C1753 CATH, INTRAVAS ULTRASOUND: HCPCS | Performed by: INTERNAL MEDICINE

## 2024-05-22 PROCEDURE — 25000003 PHARM REV CODE 250: Performed by: INTERNAL MEDICINE

## 2024-05-22 PROCEDURE — 99153 MOD SED SAME PHYS/QHP EA: CPT | Performed by: INTERNAL MEDICINE

## 2024-05-22 PROCEDURE — C1894 INTRO/SHEATH, NON-LASER: HCPCS | Performed by: INTERNAL MEDICINE

## 2024-05-22 PROCEDURE — C1725 CATH, TRANSLUMIN NON-LASER: HCPCS | Performed by: INTERNAL MEDICINE

## 2024-05-22 PROCEDURE — 27201423 OPTIME MED/SURG SUP & DEVICES STERILE SUPPLY: Performed by: INTERNAL MEDICINE

## 2024-05-22 PROCEDURE — 63600175 PHARM REV CODE 636 W HCPCS: Performed by: INTERNAL MEDICINE

## 2024-05-22 PROCEDURE — 92978 ENDOLUMINL IVUS OCT C 1ST: CPT | Mod: LD | Performed by: INTERNAL MEDICINE

## 2024-05-22 PROCEDURE — 92928 PRQ TCAT PLMT NTRAC ST 1 LES: CPT | Mod: LD,,, | Performed by: INTERNAL MEDICINE

## 2024-05-22 PROCEDURE — 92978 ENDOLUMINL IVUS OCT C 1ST: CPT | Mod: 26,LD,, | Performed by: INTERNAL MEDICINE

## 2024-05-22 PROCEDURE — C1874 STENT, COATED/COV W/DEL SYS: HCPCS | Performed by: INTERNAL MEDICINE

## 2024-05-22 PROCEDURE — C1887 CATHETER, GUIDING: HCPCS | Performed by: INTERNAL MEDICINE

## 2024-05-22 PROCEDURE — 25500020 PHARM REV CODE 255: Performed by: INTERNAL MEDICINE

## 2024-05-22 DEVICE — EVEROLIMUS-ELUTING PLATINUM CHROMIUM CORONARY STENT SYSTEM
Type: IMPLANTABLE DEVICE | Site: CORONARY | Status: FUNCTIONAL
Brand: SYNERGY™ XD

## 2024-05-22 RX ORDER — FENTANYL CITRATE 50 UG/ML
INJECTION, SOLUTION INTRAMUSCULAR; INTRAVENOUS
Status: DISCONTINUED | OUTPATIENT
Start: 2024-05-22 | End: 2024-05-22 | Stop reason: HOSPADM

## 2024-05-22 RX ORDER — DIPHENHYDRAMINE HCL 25 MG
50 CAPSULE ORAL ONCE
Status: COMPLETED | OUTPATIENT
Start: 2024-05-22 | End: 2024-05-22

## 2024-05-22 RX ORDER — LIDOCAINE HYDROCHLORIDE 10 MG/ML
INJECTION, SOLUTION EPIDURAL; INFILTRATION; INTRACAUDAL; PERINEURAL
Status: DISCONTINUED | OUTPATIENT
Start: 2024-05-22 | End: 2024-05-22 | Stop reason: HOSPADM

## 2024-05-22 RX ORDER — HEPARIN SODIUM 1000 [USP'U]/ML
INJECTION, SOLUTION INTRAVENOUS; SUBCUTANEOUS
Status: DISCONTINUED | OUTPATIENT
Start: 2024-05-22 | End: 2024-05-22 | Stop reason: HOSPADM

## 2024-05-22 RX ORDER — ONDANSETRON HYDROCHLORIDE 2 MG/ML
4 INJECTION, SOLUTION INTRAVENOUS EVERY 12 HOURS PRN
Status: DISCONTINUED | OUTPATIENT
Start: 2024-05-22 | End: 2024-05-22 | Stop reason: HOSPADM

## 2024-05-22 RX ORDER — OXYCODONE HYDROCHLORIDE 5 MG/1
5 TABLET ORAL EVERY 4 HOURS PRN
Status: DISCONTINUED | OUTPATIENT
Start: 2024-05-22 | End: 2024-05-22 | Stop reason: HOSPADM

## 2024-05-22 RX ORDER — MORPHINE SULFATE 4 MG/ML
3 INJECTION, SOLUTION INTRAMUSCULAR; INTRAVENOUS
Status: DISCONTINUED | OUTPATIENT
Start: 2024-05-22 | End: 2024-05-22 | Stop reason: HOSPADM

## 2024-05-22 RX ORDER — MIDAZOLAM HYDROCHLORIDE 1 MG/ML
INJECTION, SOLUTION INTRAMUSCULAR; INTRAVENOUS
Status: DISCONTINUED | OUTPATIENT
Start: 2024-05-22 | End: 2024-05-22 | Stop reason: HOSPADM

## 2024-05-22 RX ORDER — NITROGLYCERIN 5 MG/ML
INJECTION, SOLUTION INTRAVENOUS
Status: DISCONTINUED | OUTPATIENT
Start: 2024-05-22 | End: 2024-05-22 | Stop reason: HOSPADM

## 2024-05-22 RX ORDER — ACETAMINOPHEN 325 MG/1
650 TABLET ORAL EVERY 4 HOURS PRN
Status: DISCONTINUED | OUTPATIENT
Start: 2024-05-22 | End: 2024-05-22 | Stop reason: HOSPADM

## 2024-05-22 RX ORDER — PHENYLEPHRINE HCL IN 0.9% NACL 1 MG/10 ML
SYRINGE (ML) INTRAVENOUS
Status: DISCONTINUED | OUTPATIENT
Start: 2024-05-22 | End: 2024-05-22 | Stop reason: HOSPADM

## 2024-05-22 RX ORDER — SODIUM CHLORIDE 9 MG/ML
INJECTION, SOLUTION INTRAVENOUS CONTINUOUS
Status: DISCONTINUED | OUTPATIENT
Start: 2024-05-22 | End: 2024-05-22 | Stop reason: HOSPADM

## 2024-05-22 RX ADMIN — SODIUM CHLORIDE 1000 ML: 9 INJECTION, SOLUTION INTRAVENOUS at 06:05

## 2024-05-22 RX ADMIN — DIPHENHYDRAMINE HYDROCHLORIDE 50 MG: 25 CAPSULE ORAL at 06:05

## 2024-05-22 NOTE — H&P
REASON FOR CONSULT:   Jt Avila is a 64 y.o. male who presents for       Chief Complaint   Patient presents with    Follow-up      HISTORY OF PRESENT ILLNESS:      Patient is a pleasant 64-year-old man.  Recently came to the hospital and underwent PCI of circumflex for myocardial infarction.  Chest pain much better now, occasionally gets chest pain on exertion.  Lifestyle limiting per patient.  Has residual  of his RCA as well as LAD disease.  I had a detailed discussion with the patient about the risks benefits of coronary artery bypass grafting with LIMA to LAD and SVG to RCA (assuming it is possible because his PDA and PLB are small diffusely diseased vessels and might not be good targets for a vein graft) versus PCI of LAD and subsequently RCA  revascularization if patient continues to have lifestyle limiting angina.  Had a detailed discussion with the patient and his family.  After detailed discussion patient stated that he does not want to go see CT surgery and is not interested in CT surgery at all and would like to proceed PCI of LAD at the current time           Results for orders placed or performed during the hospital encounter of 03/22/24   EKG 12-lead     Collection Time: 03/23/24  8:14 AM   Result Value Ref Range     QRS Duration 92 ms     OHS QTC Calculation 398 ms     Narrative     Test Reason : troponin 32     Vent. Rate : 058 BPM     Atrial Rate : 058 BPM     P-R Int : 136 ms          QRS Dur : 092 ms      QT Int : 406 ms       P-R-T Axes : 039 -14 028 degrees     QTc Int : 398 ms     Sinus bradycardia  Possible Inferior infarct (cited on or before 22-MAR-2024)  Cannot rule out Anterior infarct ,age undetermined  Abnormal ECG  When compared with ECG of 22-MAR-2024 18:52,  Significant changes have occurred  Confirmed by Srinivasan Colorado MD ([id number]) on 3/28/2024 11:15:43 PM     Referred By: [email or web address]   SELF           Confirmed By:Srinivasan Colorado MD             ECHO     Results for orders placed during  the hospital encounter of 03/22/24     Echo     Interpretation Summary    Left Ventricle: The left ventricle is normal in size. Mildly increased wall thickness. Septal thickening. There is concentric hypertrophy. There is normal systolic function with a visually estimated ejection fraction of 55 - 60%. Grade I diastolic dysfunction.    Right Ventricle: Normal right ventricular cavity size. Systolic function is normal.    Left Atrium: Left atrium is moderately dilated.    Right Atrium: Right atrium is mildly dilated.    Mitral Valve: There is mild regurgitation.    Tricuspid Valve: There is mild regurgitation.    Pulmonary Artery: The estimated pulmonary artery systolic pressure is 25 mmHg.    IVC/SVC: Normal venous pressure at 3 mmHg.        STRESS TEST     No results found for this or any previous visit.           CATH     Results for orders placed during the hospital encounter of 03/22/24     Cardiac catheterization     Conclusion    There was three vessel coronary artery disease.    Successful IVUS guided PCI of 95% stenosis in proximal circumflex with NORMAN x1.  This was the culprit vessel for the patient's non-STEMI Prox Cx lesion: A .    The Prox Cx lesion was 95% stenosed with 0% stenosis post-intervention.    The Prox RCA lesion was 100% stenosed.    The Mid LAD lesion was 80% stenosed.    The post-procedure left ventricular end diastolic pressure was 20.    Prox Cx lesion: A STENT SYNERGY XD [id number] stent was successfully placed at 11 AURELIO for 15 sec.    The estimated blood loss was <50 mL.     Coronary angiogram:     Left main:  No significant stenosis     Lad:  Proximal 30-40% stenosis and mid 80% stenosis     Circumflex: Culprit vessel.  Proximal 95% stenosis.  Successful PCI with NORMAN     RCA:  Proximal .  Fills via bridging collaterals from proximal RCA to mid RCA as well as collaterals from septal branches of the LAD     Access: Right radial artery access     Assessment and plan     Continue aspirin  and Plavix     Follow-up in Cardiology Clinic with Dr. Soliz to discuss PCI of LAD +/-  revascularization of RCA if needed versus coronary artery bypass grafting     Statin therapy with goal LDL less than 70                       The procedure log was documented by Documenter: Sumeet Barreto RN and verified by Srinivasan Colorado MD.     Date: 3/23/2024  Time: 3:09 PM        PAST MEDICAL HISTORY:   No past medical history on file.     PAST SURGICAL HISTORY:            Past Surgical History:   Procedure Laterality Date    IVUS, CORONARY   3/23/2024     Procedure: IVUS, Coronary;  Surgeon: Srinivasan Colorado MD;  Location: [facility] CATH LAB;  Service: Cardiology;;    LEFT HEART CATHETERIZATION Left 3/23/2024     Procedure: Left heart cath;  Surgeon: Srinivasan Colorado MD;  Location: [facility] CATH LAB;  Service: Cardiology;  Laterality: Left;    PERCUTANEOUS TRANSLUMINAL BALLOON ANGIOPLASTY OF CORONARY ARTERY   3/23/2024     Procedure: Angioplasty-coronary;  Surgeon: Srinivasan Colorado MD;  Location: [facility] CATH LAB;  Service: Cardiology;;    STENT, DRUG ELUTING, SINGLE VESSEL, CORONARY   3/23/2024     Procedure: Stent, Drug Eluting, Single Vessel, Coronary;  Surgeon: Srinivasan Colorado MD;  Location: [facility] CATH LAB;  Service: Cardiology;;               SOCIAL HISTORY:      Social History           Socioeconomic History    Marital status:          FAMILY HISTORY:   No family history on file.     REVIEW OF SYSTEMS:   Review of Systems   Constitutional: Negative.   HENT: Negative.     Eyes: Negative.    Cardiovascular:  Positive for chest pain.   Respiratory: Negative.     Endocrine: Negative.    Hematologic/Lymphatic: Negative.    Skin: Negative.    Musculoskeletal: Negative.    Gastrointestinal: Negative.    Genitourinary: Negative.    Neurological: Negative.    Psychiatric/Behavioral: Negative.     Allergic/Immunologic: Negative.          A 10 point review of systems was performed and all the pertinent positives have been  "mentioned. Rest of review of systems was negative.           PHYSICAL EXAM:          Vitals:     04/19/24 0957   BP: 116/64   Pulse: 70   Resp: 18    Body mass index is 25.57 kg/m².  Weight: 69.7 kg (153 lb 10.6 oz)   Height: 5' 5" (165.1 cm)      Physical Exam  Vitals reviewed.   Constitutional:       Appearance: He is well-developed.   HENT:      Head: Normocephalic.   Eyes:      Conjunctiva/sclera: Conjunctivae normal.      Pupils: Pupils are equal, round, and reactive to light.   Cardiovascular:      Rate and Rhythm: Normal rate and regular rhythm.      Heart sounds: Normal heart sounds.   Pulmonary:      Effort: Pulmonary effort is normal.      Breath sounds: Normal breath sounds.   Abdominal:      General: Bowel sounds are normal.      Palpations: Abdomen is soft.   Musculoskeletal:      Cervical back: Normal range of motion and neck supple.   Skin:     General: Skin is warm.   Neurological:      Mental Status: He is alert and oriented to person, place, and time.               DATA:      Laboratory:  CBC:       Recent Labs   Lab 03/22/24 1941 03/23/24  0345   WBC 7.95 10.35   Hemoglobin 15.0 14.5   Hematocrit 45.5 43.1   Platelets 208 210         CHEMISTRIES:        Recent Labs   Lab 03/22/24 1941 03/23/24  0345 03/24/24  0954   Glucose 114 H 108 126 H   Sodium 138 139 141   Potassium 4.1 3.5 3.0 L   BUN 8 8 9   Creatinine 0.9 0.8 0.8   Calcium 9.1 8.5 L 7.9 L   Magnesium  --  2.2  --          CARDIAC BIOMARKERS:        Recent Labs   Lab 03/22/24 1941 03/22/24 2314 03/23/24  0345   Troponin I 1.471 H 5.212 H 32.935 H         COAGS:       Recent Labs   Lab 03/22/24 2054 03/23/24  0345   INR 1.0 1.0         LIPIDS/LFTS:       Recent Labs   Lab 03/22/24 1941 03/23/24  0345   Cholesterol  --  210 H   Triglycerides  --  78   HDL  --  39 L   LDL Cholesterol  --  155.4   Non-HDL Cholesterol  --  171   AST 65 H  --    ALT 23  --                  Hemoglobin A1C   Date Value Ref Range Status   03/23/2024 5.4 4.0 - " 5.6 % Final       Comment:       ADA Screening Guidelines:  5.7-6.4%  Consistent with prediabetes  >or=6.5%  Consistent with diabetes     High levels of fetal hemoglobin interfere with the HbA1C  assay. Heterozygous hemoglobin variants (HbS, HgC, etc)do  not significantly interfere with this assay.   However, presence of multiple variants may affect accuracy.            TSH         The ASCVD Risk score (Kareem DK, et al., 2019) failed to calculate for the following reasons:    The patient has a prior MI or stroke diagnosis         BNP           Lab Results   Component Value Date/Time     BNP 48 03/22/2024 07:41 PM            ASSESSMENT AND PLAN      Problem List       Patient Active Problem List   Diagnosis    NSTEMI (non-ST elevated myocardial infarction)    Smoking               ALLERGIES AND MEDICATION:   Review of patient's allergies indicates:  No Known Allergies      Medication List               Accurate as of April 19, 2024 10:47 AM. If you have any questions, ask your nurse or doctor.                    START taking these medications       metoprolol succinate 25 MG 24 hr tablet  Commonly known as: TOPROL-XL  Take 1 tablet (25 mg total) by mouth once daily.  Started by: Srinivasan Colorado MD      nitroGLYCERIN 0.4 MG SL tablet  Commonly known as: NITROSTAT  Place 1 tablet (0.4 mg total) under the tongue every 5 (five) minutes as needed for Chest pain.  Started by: Srinivasan Colorado MD                CONTINUE taking these medications       aspirin 81 MG Chew  Take 1 tablet (81 mg total) by mouth once daily.      atorvastatin 40 MG tablet  Commonly known as: LIPITOR  Take 1 tablet (40 mg total) by mouth every evening.      clopidogreL 75 mg tablet  Commonly known as: PLAVIX  Take 1 tablet (75 mg total) by mouth once daily.                    Where to Get Your Medications          These medications were sent to [facility]/pharmacy #[id number] - [place] - [id number]  [address] [place] [address]         Phone: [phone]   metoprolol succinate 25 MG 24 hr tablet  nitroGLYCERIN 0.4 MG SL tablet                Orders Placed This Encounter   Procedures    Clip and Prep Right Radial, Right Groin, Left Groin    Ankle Brachial Indices (CELESTINE)      Coronary artery disease status post PCI of circumflex.  Continue aspirin and Plavix.  Continue statins with goal LDL less than 70.  Because of continued lifestyle limiting angina, will proceed with PCI of LAD.  Nitroglycerin p.r.n..  Toprol-XL     Risks, benefits and alternatives of the catheterization procedure were discussed with the patient.The risks of coronary angiography include but are not limited to: bleeding, infection, death, heart attack, arrhythmia, kidney injury or failure, potential need for dialysis, allergic reactions, stroke, need for emergency surgery, hematoma, pseudoaneurysm etc.  Should stenting be indicated, the patient has agreed to dual anti-platelet therapy for 1-consecutive year with a drug-eluting stent and a minimum of 1-month with the use of a bare metal stent. Additionally, pt is aware that non-compliance is likely to result in stent clotting with heart attack, heart failure, and/or death  The risks of moderate sedation include hypotension, respiratory depression, arrhythmias, bronchospasm, and death. Informed consent was obtained and the  patient is agreeable to proceed with the procedure. Consent was placed on the chart.     Dyslipidemia:  Continue Lipitor.  Goal LDL is less than 70           Lab Results   Component Value Date     LDLCALC 155.4 03/23/2024         Follow-up after testing

## 2024-05-22 NOTE — Clinical Note
The catheter was inserted into the and was inserted over the wire into the ostium   left anterior descending. An angiography was performed of the left coronary arteries. Multiple views were taken. The angiography was performed via power injection.

## 2024-05-22 NOTE — PLAN OF CARE
Discharge criteria met.  Discharge instructions given verbally and per handout. Pt verbalized understanding. IV removed.  Pt discharged home with wife via wheelchair.

## 2024-05-22 NOTE — Clinical Note
The catheter was inserted into the, was removed from the and was inserted over the wire into the distal   left anterior descending. IVUS performed, catheter removed

## 2024-05-22 NOTE — Clinical Note
The catheter was inserted into the, was removed from the and was inserted over the wire into the left anterior descending. IVUS performed, catheter removed

## 2024-05-22 NOTE — DISCHARGE INSTRUCTIONS
Limit movement of the wrist.  Do not bend wrist or perform heavy lifting for 24 hours.  No blood pressure cuff or venipuncture to affected arm for 24 hours.   If bleeding occurs, apply pressure to site for 20 minutes.  Apply band aid once bleeding stops.  Call 911 if unable to stop bleeding with pressure.    Keep your follow up appointment.  Do not drive, drink alcohol, or sign legal documents for 24 hours, or if taking narcotic pain medication.    Drink plenty of fluids for the next 48 hours and follow your doctor's diet orders.  Keep handsplint in place 24 hours.  Remove the dressing in 24 hours and you may shower.  Clean the area with soap and water and apply a band aid for the next 5 days.

## 2024-06-07 ENCOUNTER — OFFICE VISIT (OUTPATIENT)
Dept: CARDIOLOGY | Facility: CLINIC | Age: 64
End: 2024-06-07
Payer: COMMERCIAL

## 2024-06-07 VITALS
DIASTOLIC BLOOD PRESSURE: 72 MMHG | HEART RATE: 58 BPM | RESPIRATION RATE: 18 BRPM | SYSTOLIC BLOOD PRESSURE: 116 MMHG | OXYGEN SATURATION: 99 % | WEIGHT: 156.88 LBS | HEIGHT: 65 IN | BODY MASS INDEX: 26.14 KG/M2

## 2024-06-07 DIAGNOSIS — I25.10 CORONARY ARTERY DISEASE INVOLVING NATIVE CORONARY ARTERY OF NATIVE HEART, UNSPECIFIED WHETHER ANGINA PRESENT: Primary | ICD-10-CM

## 2024-06-07 DIAGNOSIS — I25.10 CORONARY ARTERY DISEASE, UNSPECIFIED VESSEL OR LESION TYPE, UNSPECIFIED WHETHER ANGINA PRESENT, UNSPECIFIED WHETHER NATIVE OR TRANSPLANTED HEART: ICD-10-CM

## 2024-06-07 DIAGNOSIS — R07.9 CHEST PAIN, UNSPECIFIED TYPE: ICD-10-CM

## 2024-06-07 PROCEDURE — 3074F SYST BP LT 130 MM HG: CPT | Mod: CPTII,S$GLB,, | Performed by: INTERNAL MEDICINE

## 2024-06-07 PROCEDURE — 1159F MED LIST DOCD IN RCRD: CPT | Mod: CPTII,S$GLB,, | Performed by: INTERNAL MEDICINE

## 2024-06-07 PROCEDURE — 99999 PR PBB SHADOW E&M-EST. PATIENT-LVL IV: CPT | Mod: PBBFAC,,, | Performed by: INTERNAL MEDICINE

## 2024-06-07 PROCEDURE — 99214 OFFICE O/P EST MOD 30 MIN: CPT | Mod: S$GLB,,, | Performed by: INTERNAL MEDICINE

## 2024-06-07 PROCEDURE — 93000 ELECTROCARDIOGRAM COMPLETE: CPT | Mod: S$GLB,,, | Performed by: INTERNAL MEDICINE

## 2024-06-07 PROCEDURE — 3008F BODY MASS INDEX DOCD: CPT | Mod: CPTII,S$GLB,, | Performed by: INTERNAL MEDICINE

## 2024-06-07 PROCEDURE — 3044F HG A1C LEVEL LT 7.0%: CPT | Mod: CPTII,S$GLB,, | Performed by: INTERNAL MEDICINE

## 2024-06-07 PROCEDURE — 3078F DIAST BP <80 MM HG: CPT | Mod: CPTII,S$GLB,, | Performed by: INTERNAL MEDICINE

## 2024-06-07 PROCEDURE — 1160F RVW MEDS BY RX/DR IN RCRD: CPT | Mod: CPTII,S$GLB,, | Performed by: INTERNAL MEDICINE

## 2024-06-07 RX ORDER — ATORVASTATIN CALCIUM 80 MG/1
80 TABLET, FILM COATED ORAL NIGHTLY
Qty: 90 TABLET | Refills: 3 | Status: SHIPPED | OUTPATIENT
Start: 2024-06-07

## 2024-06-07 NOTE — PROGRESS NOTES
CARDIOVASCULAR CONSULTATION    REASON FOR CONSULT:   Jt Avila is a 64 y.o. male who presents for   Chief Complaint   Patient presents with    Post-op Evaluation     HISTORY OF PRESENT ILLNESS:     Patient is a pleasant 64-year-old man.  Recently came to the hospital and underwent PCI of circumflex for myocardial infarction.  Chest pain much better now, occasionally gets chest pain on exertion.  Lifestyle limiting per patient.  Has residual  of his RCA as well as LAD disease.  I had a detailed discussion with the patient about the risks benefits of coronary artery bypass grafting with LIMA to LAD and SVG to RCA (assuming it is possible because his PDA and PLB are small diffusely diseased vessels and might not be good targets for a vein graft) versus PCI of LAD and subsequently RCA  revascularization if patient continues to have lifestyle limiting angina.  Had a detailed discussion with the patient and his family.  After detailed discussion patient stated that he does not want to go see CT surgery and is not interested in CT surgery at all and would like to proceed PCI of LAD at the current time    Results for orders placed or performed during the hospital encounter of 05/16/24   EKG 12-lead    Collection Time: 05/16/24 10:55 AM   Result Value Ref Range    QRS Duration 98 ms    [facility] QTC Calculation 420 ms    Narrative    Test Reason : I25.10,    Vent. Rate : 055 BPM     Atrial Rate : 055 BPM     P-R Int : 128 ms          QRS Dur : 098 ms      QT Int : 440 ms       P-R-T Axes : 034 -16 -06 degrees     QTc Int : 420 ms    Sinus bradycardia  Inferior-posterior infarct (cited on or before 22-MAR-2024)  Abnormal ECG  When compared with ECG of 23-MAR-2024 08:14,  Significant changes have occurred  Confirmed by Ten Corrales MD ([id number]) on 5/16/2024 11:09:23 AM    Referred By:             Confirmed By:Ten Corrales MD          ECHO    Results for orders placed during the hospital encounter of  03/22/24    Echo    Interpretation Summary    Left Ventricle: The left ventricle is normal in size. Mildly increased wall thickness. Septal thickening. There is concentric hypertrophy. There is normal systolic function with a visually estimated ejection fraction of 55 - 60%. Grade I diastolic dysfunction.    Right Ventricle: Normal right ventricular cavity size. Systolic function is normal.    Left Atrium: Left atrium is moderately dilated.    Right Atrium: Right atrium is mildly dilated.    Mitral Valve: There is mild regurgitation.    Tricuspid Valve: There is mild regurgitation.    Pulmonary Artery: The estimated pulmonary artery systolic pressure is 25 mmHg.    IVC/SVC: Normal venous pressure at 3 mmHg.      STRESS TEST    No results found for this or any previous visit.        CATH    Results for orders placed during the hospital encounter of 03/22/24    Cardiac catheterization    Conclusion    There was three vessel coronary artery disease.    Successful IVUS guided PCI of 95% stenosis in proximal circumflex with NORMAN x1.  This was the culprit vessel for the patient's non-STEMI Prox Cx lesion: A .    The Prox Cx lesion was 95% stenosed with 0% stenosis post-intervention.    The Prox RCA lesion was 100% stenosed.    The Mid LAD lesion was 80% stenosed.    The post-procedure left ventricular end diastolic pressure was 20.    Prox Cx lesion: A STENT SYNERGY XD 3.0X20MM stent was successfully placed at 11 AURELIO for 15 sec.    The estimated blood loss was <50 mL.    Coronary angiogram:    Left main:  No significant stenosis    Lad:  Proximal 30-40% stenosis and mid 80% stenosis    Circumflex: Culprit vessel.  Proximal 95% stenosis.  Successful PCI with NORMAN    RCA:  Proximal .  Fills via bridging collaterals from proximal RCA to mid RCA as well as collaterals from septal branches of the LAD    Access: Right radial artery access    Assessment and plan    Continue aspirin and Plavix    Follow-up in Cardiology Clinic  with Dr. Soliz to discuss PCI of LAD +/-  revascularization of RCA if needed versus coronary artery bypass grafting    Statin therapy with goal LDL less than 70                The procedure log was documented by Documenter: Sumeet Barreto RN and verified by Srinivasan Colorado MD.    Date: 3/23/2024  Time: 3:09 PM      Notes from June 20, 2024: Patient here for follow-up post PCI.  No complications from angiogram.  Doing fine.  Denies chest pains at rest on exertion, orthopnea, PND    PAST MEDICAL HISTORY:     Past Medical History:   Diagnosis Date    Coronary artery disease        PAST SURGICAL HISTORY:     Past Surgical History:   Procedure Laterality Date    CORONARY STENT PLACEMENT N/A 5/22/2024    Procedure: INSERTION, STENT, CORONARY ARTERY;  Surgeon: Srinivasan Colorado MD;  Location: [facility] CATH LAB;  Service: Cardiology;  Laterality: N/A;  pci lad  RN PREOP 5/16/2024----NEED H/P    EYE SURGERY      IVUS, CORONARY  03/23/2024    Procedure: IVUS, Coronary;  Surgeon: Srinivasan Colorado MD;  Location: [facility] CATH LAB;  Service: Cardiology;;    IVUS, CORONARY  5/22/2024    Procedure: IVUS, Coronary;  Surgeon: Srinivasan Colorado MD;  Location: [facility] CATH LAB;  Service: Cardiology;;    LEFT HEART CATHETERIZATION Left 03/23/2024    Procedure: Left heart cath;  Surgeon: Srinivasan Colorado MD;  Location: [facility] CATH LAB;  Service: Cardiology;  Laterality: Left;    PERCUTANEOUS TRANSLUMINAL BALLOON ANGIOPLASTY OF CORONARY ARTERY  03/23/2024    Procedure: Angioplasty-coronary;  Surgeon: Srinivasan Colorado MD;  Location: [facility] CATH LAB;  Service: Cardiology;;    STENT, DRUG ELUTING, SINGLE VESSEL, CORONARY  03/23/2024    Procedure: Stent, Drug Eluting, Single Vessel, Coronary;  Surgeon: Srinivasan Colorado MD;  Location: [facility] CATH LAB;  Service: Cardiology;;           SOCIAL HISTORY:     Social History     Socioeconomic History    Marital status:    Tobacco Use    Smoking status: Former     Current packs/day: 0.00     Types:  "Cigarettes     Quit date: 2024     Years since quittin.2    Smokeless tobacco: Never   Substance and Sexual Activity    Alcohol use: Not Currently    Drug use: Never       FAMILY HISTORY:   No family history on file.    REVIEW OF SYSTEMS:   Review of Systems   Constitutional: Negative.   HENT: Negative.     Eyes: Negative.    Respiratory: Negative.     Endocrine: Negative.    Hematologic/Lymphatic: Negative.    Skin: Negative.    Musculoskeletal: Negative.    Gastrointestinal: Negative.    Genitourinary: Negative.    Neurological: Negative.    Psychiatric/Behavioral: Negative.     Allergic/Immunologic: Negative.        A 10 point review of systems was performed and all the pertinent positives have been mentioned. Rest of review of systems was negative.        PHYSICAL EXAM:     Vitals:    24 0936   BP: 116/72   Pulse: (!) 58   Resp: 18    Body mass index is 26.1 kg/m².  Weight: 71.2 kg (156 lb 13.7 oz)   Height: 5' 5" (165.1 cm)     Physical Exam  Vitals reviewed.   Constitutional:       Appearance: He is well-developed.   HENT:      Head: Normocephalic.   Eyes:      Conjunctiva/sclera: Conjunctivae normal.      Pupils: Pupils are equal, round, and reactive to light.   Cardiovascular:      Rate and Rhythm: Normal rate and regular rhythm.      Heart sounds: Normal heart sounds.   Pulmonary:      Effort: Pulmonary effort is normal.      Breath sounds: Normal breath sounds.   Abdominal:      General: Bowel sounds are normal.      Palpations: Abdomen is soft.   Musculoskeletal:      Cervical back: Normal range of motion and neck supple.   Skin:     General: Skin is warm.   Neurological:      Mental Status: He is alert and oriented to person, place, and time.         DATA:     Laboratory:  CBC:  Recent Labs   Lab 24  1941 24  0345 24  1100   WBC 7.95 10.35 7.00   Hemoglobin 15.0 14.5 13.5 L   Hematocrit 45.5 43.1 41.0   Platelets 208 210 193       CHEMISTRIES:  Recent Labs   Lab " 03/23/24 0345 03/24/24  0954 05/16/24  1100   Glucose 108 126 H 91   Sodium 139 141 143   Potassium 3.5 3.0 L 3.4 L   BUN 8 9 8   Creatinine 0.8 0.8 0.9   Calcium 8.5 L 7.9 L 8.7   Magnesium 2.2  --   --        CARDIAC BIOMARKERS:  Recent Labs   Lab 03/22/24 1941 03/22/24  2314 03/23/24  0345   Troponin I 1.471 H 5.212 H 32.935 H       COAGS:  Recent Labs   Lab 03/22/24 2054 03/23/24  0345   INR 1.0 1.0       LIPIDS/LFTS:  Recent Labs   Lab 03/22/24 1941 03/23/24 0345   Cholesterol  --  210 H   Triglycerides  --  78   HDL  --  39 L   LDL Cholesterol  --  155.4   Non-HDL Cholesterol  --  171   AST 65 H  --    ALT 23  --        Hemoglobin A1C   Date Value Ref Range Status   03/23/2024 5.4 4.0 - 5.6 % Final     Comment:     ADA Screening Guidelines:  5.7-6.4%  Consistent with prediabetes  >or=6.5%  Consistent with diabetes    High levels of fetal hemoglobin interfere with the HbA1C  assay. Heterozygous hemoglobin variants (HbS, HgC, etc)do  not significantly interfere with this assay.   However, presence of multiple variants may affect accuracy.         TSH        The ASCVD Risk score (Kareem DK, et al., 2019) failed to calculate for the following reasons:    The patient has a prior MI or stroke diagnosis       BNP    Lab Results   Component Value Date/Time    BNP 48 03/22/2024 07:41 PM         ASSESSMENT AND PLAN     Patient Active Problem List   Diagnosis    NSTEMI (non-ST elevated myocardial infarction)    Smoking         ALLERGIES AND MEDICATION:   Review of patient's allergies indicates:  No Known Allergies     Medication List            Accurate as of June 7, 2024  9:49 AM. If you have any questions, ask your nurse or doctor.                CONTINUE taking these medications      aspirin 81 MG Chew  Take 1 tablet (81 mg total) by mouth once daily.     atorvastatin 40 MG tablet  Commonly known as: LIPITOR  Take 1 tablet (40 mg total) by mouth every evening.     clopidogreL 75 mg tablet  Commonly known as:  PLAVIX  Take 1 tablet (75 mg total) by mouth once daily.     metoprolol succinate 25 MG 24 hr tablet  Commonly known as: TOPROL-XL  Take 1 tablet (25 mg total) by mouth once daily.     nitroGLYCERIN 0.4 MG SL tablet  Commonly known as: NITROSTAT  Place 1 tablet (0.4 mg total) under the tongue every 5 (five) minutes as needed for Chest pain.              No orders of the defined types were placed in this encounter.    Coronary artery disease status post PCI of circumflex  and LAD.  Known RCA .  No more angina  Continue aspirin and Plavix.  Continue statins with goal LDL less than 70.      Dyslipidemia:  Continue Lipitor.  Goal LDL is less than 70.  Increase Lipitor to 80 mg daily    Lab Results   Component Value Date    LDLCALC 155.4 03/23/2024      Check ABIs prior to next visit      Follow-up after 3 m      Thank you very much for involving me in the care of your patient.  Please do not hesitate to contact me if there are any questions.      Srinivasan Colorado MD, FACC, [facility]  Interventional Cardiologist, [facility].           This note was dictated with the help of speech recognition software.  There might be un-intended errors and/or substitutions.

## 2024-06-10 LAB
OHS QRS DURATION: 102 MS
OHS QTC CALCULATION: 422 MS

## 2024-06-24 PROBLEM — I21.4 NSTEMI (NON-ST ELEVATED MYOCARDIAL INFARCTION): Status: RESOLVED | Noted: 2024-03-22 | Resolved: 2024-06-24

## 2025-03-05 NOTE — TELEPHONE ENCOUNTER
----- Message from Amanda sent at 3/5/2025  4:13 PM CST -----  Contact: Patient  Type:  Patient CallWho Called: patient Does the patient know what this is regarding?: Requesting a call back about having refills on Rx aspirin 81 MG Chew & clopidogreL (PLAVIX) 75 mg tablet ; pt is completely out of his Asprin his medication ;  pt would like to have a sooner appointment; please advise Would the patient rather a call back or a response via [facility]? Call Best Call Back Number: [phone] Additional Information: [facility]/pharmacy #[id number] - [place] - [id number] [place] [address]Kfnjc: [phone] Fax: [phone]

## 2025-03-05 NOTE — TELEPHONE ENCOUNTER
----- Message from Amanda sent at 3/5/2025  4:13 PM CST -----  Contact: Patient  Type:  Patient CallWho Called: patient Does the patient know what this is regarding?: Requesting a call back about having refills on Rx aspirin 81 MG Chew & clopidogreL (PLAVIX) 75 mg tablet ; pt is completely out of his Asprin his medication ;  pt would like to have a sooner appointment; please advise Would the patient rather a call back or a response via [facility]? Call Best Call Back Number: [phone] Additional Information: [facility]/pharmacy #[id number] - [place] - [id number] [place] [address]Ecatw: [phone] Fax: [phone]

## 2025-03-06 NOTE — TELEPHONE ENCOUNTER
----- Message from Amanda sent at 3/5/2025  4:13 PM CST -----  Contact: Patient  Type:  Patient CallWho Called: patient Does the patient know what this is regarding?: Requesting a call back about having refills on Rx aspirin 81 MG Chew & clopidogreL (PLAVIX) 75 mg tablet ; pt is completely out of his Asprin his medication ;  pt would like to have a sooner appointment; please advise Would the patient rather a call back or a response via [facility]? Call Best Call Back Number: [phone] Additional Information: [facility]/pharmacy #[id number] - [place] - [id number] [place] [address]Mfivq: [phone] Fax: [phone]

## 2025-03-07 RX ORDER — CLOPIDOGREL BISULFATE 75 MG/1
75 TABLET ORAL DAILY
Qty: 90 TABLET | Refills: 3 | Status: SHIPPED | OUTPATIENT
Start: 2025-03-07 | End: 2026-03-07

## 2025-03-07 RX ORDER — NAPROXEN SODIUM 220 MG/1
81 TABLET, FILM COATED ORAL DAILY
Qty: 90 TABLET | Refills: 3 | Status: SHIPPED | OUTPATIENT
Start: 2025-03-07 | End: 2026-03-07

## 2025-03-16 RX ORDER — NAPROXEN SODIUM 220 MG/1
81 TABLET, FILM COATED ORAL
Qty: 90 TABLET | Refills: 3 | OUTPATIENT
Start: 2025-03-16

## 2025-03-21 ENCOUNTER — OFFICE VISIT (OUTPATIENT)
Dept: CARDIOLOGY | Facility: CLINIC | Age: 65
End: 2025-03-21
Payer: COMMERCIAL

## 2025-03-21 VITALS
OXYGEN SATURATION: 96 % | HEART RATE: 70 BPM | HEIGHT: 65 IN | BODY MASS INDEX: 26.54 KG/M2 | DIASTOLIC BLOOD PRESSURE: 80 MMHG | WEIGHT: 159.31 LBS | RESPIRATION RATE: 18 BRPM | SYSTOLIC BLOOD PRESSURE: 116 MMHG

## 2025-03-21 DIAGNOSIS — I25.10 CORONARY ARTERY DISEASE INVOLVING NATIVE CORONARY ARTERY OF NATIVE HEART, UNSPECIFIED WHETHER ANGINA PRESENT: ICD-10-CM

## 2025-03-21 DIAGNOSIS — I73.9 CLAUDICATION: ICD-10-CM

## 2025-03-21 DIAGNOSIS — I25.10 CORONARY ARTERY DISEASE, UNSPECIFIED VESSEL OR LESION TYPE, UNSPECIFIED WHETHER ANGINA PRESENT, UNSPECIFIED WHETHER NATIVE OR TRANSPLANTED HEART: Primary | ICD-10-CM

## 2025-03-21 DIAGNOSIS — E78.5 DYSLIPIDEMIA: ICD-10-CM

## 2025-03-21 PROCEDURE — 99999 PR PBB SHADOW E&M-EST. PATIENT-LVL IV: CPT | Mod: PBBFAC,,, | Performed by: INTERNAL MEDICINE

## 2025-03-21 RX ORDER — NITROGLYCERIN 0.4 MG/1
0.4 TABLET SUBLINGUAL EVERY 5 MIN PRN
Qty: 30 TABLET | Refills: 12 | Status: SHIPPED | OUTPATIENT
Start: 2025-03-21 | End: 2025-04-20

## 2025-03-21 RX ORDER — ISOSORBIDE MONONITRATE 30 MG/1
30 TABLET, EXTENDED RELEASE ORAL DAILY
Qty: 90 TABLET | Refills: 3 | Status: SHIPPED | OUTPATIENT
Start: 2025-03-21

## 2025-03-21 RX ORDER — METOPROLOL SUCCINATE 25 MG/1
25 TABLET, EXTENDED RELEASE ORAL DAILY
Qty: 90 TABLET | Refills: 3 | Status: SHIPPED | OUTPATIENT
Start: 2025-03-21

## 2025-03-21 RX ORDER — ATORVASTATIN CALCIUM 80 MG/1
80 TABLET, FILM COATED ORAL NIGHTLY
Qty: 90 TABLET | Refills: 3 | Status: SHIPPED | OUTPATIENT
Start: 2025-03-21

## 2025-03-21 RX ORDER — CLOPIDOGREL BISULFATE 75 MG/1
75 TABLET ORAL DAILY
Qty: 90 TABLET | Refills: 3 | Status: SHIPPED | OUTPATIENT
Start: 2025-03-21 | End: 2026-03-21

## 2025-03-21 RX ORDER — NAPROXEN SODIUM 220 MG/1
81 TABLET, FILM COATED ORAL DAILY
Qty: 90 TABLET | Refills: 3 | Status: SHIPPED | OUTPATIENT
Start: 2025-03-21 | End: 2026-03-21

## 2025-03-21 NOTE — PROGRESS NOTES
CARDIOVASCULAR CONSULTATION    REASON FOR CONSULT:   Jt Avila is a 65 y.o. male who presents for   Chief Complaint   Patient presents with    Follow-up     HISTORY OF PRESENT ILLNESS:     Patient is a pleasant 64-year-old man.  Recently came to the hospital and underwent PCI of circumflex for myocardial infarction.  Chest pain much better now, occasionally gets chest pain on exertion.  Lifestyle limiting per patient.  Has residual  of his RCA as well as LAD disease.  I had a detailed discussion with the patient about the risks benefits of coronary artery bypass grafting with LIMA to LAD and SVG to RCA (assuming it is possible because his PDA and PLB are small diffusely diseased vessels and might not be good targets for a vein graft) versus PCI of LAD and subsequently RCA  revascularization if patient continues to have lifestyle limiting angina.  Had a detailed discussion with the patient and his family.  After detailed discussion patient stated that he does not want to go see CT surgery and is not interested in CT surgery at all and would like to proceed PCI of LAD at the current time    Results for orders placed or performed in visit on 06/07/24   IN OFFICE EKG 12-LEAD (to [place])    Collection Time: 06/07/24  9:24 AM   Result Value Ref Range    QRS Duration 102 ms    [facility] QTC Calculation 422 ms    Narrative    Test Reason : I25.10,    Vent. Rate : 058 BPM     Atrial Rate : 058 BPM     P-R Int : 134 ms          QRS Dur : 102 ms      QT Int : 430 ms       P-R-T Axes : 046 010 081 degrees     QTc Int : 422 ms    Sinus bradycardia  Inferior-posterior infarct (cited on or before 22-MAR-2024)  Abnormal ECG  When compared with ECG of 16-MAY-2024 10:55,  Nonspecific T wave abnormality no longer evident in Inferior leads  Nonspecific T wave abnormality has replaced inverted T waves in Lateral  leads  Confirmed by Ten Corrales MD ([id number]) on 6/10/2024 12:14:56 PM    Referred By:             Confirmed By:Ten Corrales  MD          ECHO    Results for orders placed during the hospital encounter of 03/22/24    Echo    Interpretation Summary    Left Ventricle: The left ventricle is normal in size. Mildly increased wall thickness. Septal thickening. There is concentric hypertrophy. There is normal systolic function with a visually estimated ejection fraction of 55 - 60%. Grade I diastolic dysfunction.    Right Ventricle: Normal right ventricular cavity size. Systolic function is normal.    Left Atrium: Left atrium is moderately dilated.    Right Atrium: Right atrium is mildly dilated.    Mitral Valve: There is mild regurgitation.    Tricuspid Valve: There is mild regurgitation.    Pulmonary Artery: The estimated pulmonary artery systolic pressure is 25 mmHg.    IVC/SVC: Normal venous pressure at 3 mmHg.      STRESS TEST    No results found for this or any previous visit.        CATH    Results for orders placed during the hospital encounter of 03/22/24    Cardiac catheterization    Conclusion    There was three vessel coronary artery disease.    Successful IVUS guided PCI of 95% stenosis in proximal circumflex with NORMAN x1.  This was the culprit vessel for the patient's non-STEMI Prox Cx lesion: A .    The Prox Cx lesion was 95% stenosed with 0% stenosis post-intervention.    The Prox RCA lesion was 100% stenosed.    The Mid LAD lesion was 80% stenosed.    The post-procedure left ventricular end diastolic pressure was 20.    Prox Cx lesion: A STENT SYNERGY XD [id number] stent was successfully placed at 11 AURELIO for 15 sec.    The estimated blood loss was <50 mL.    Coronary angiogram:    Left main:  No significant stenosis    Lad:  Proximal 30-40% stenosis and mid 80% stenosis    Circumflex: Culprit vessel.  Proximal 95% stenosis.  Successful PCI with NORMAN    RCA:  Proximal .  Fills via bridging collaterals from proximal RCA to mid RCA as well as collaterals from septal branches of the LAD    Access: Right radial artery  "access    Assessment and plan    Continue aspirin and Plavix    Follow-up in Cardiology Clinic with Dr. Soliz to discuss PCI of LAD +/-  revascularization of RCA if needed versus coronary artery bypass grafting    Statin therapy with goal LDL less than 70                The procedure log was documented by Documenter: Sumeet Barreto RN and verified by Srinivasan Colorado MD.    Date: 3/23/2024  Time: 3:09 PM      Notes from June 20, 2024: Patient here for follow-up post PCI.  No complications from angiogram.  Doing fine.  Denies chest pains at rest on exertion, orthopnea, PND    Notes from March 25    CHIEF COMPLAINT:  Jt presents today for follow-up of chest pain    HISTORY OF PRESENT ILLNESS:  He experiences intermittent chest pain similar to his previous heart attack symptoms. The pain occurs with exertion and is associated with breathing difficulties. He describes a sensation of something "jumping" deep inside his chest lasting for a few minutes. Episodes are triggered by sudden noises and physical activity.    CARDIOVASCULAR HISTORY:  He has a history of stent placement in the circumflex and right coronary artery (RCA), with the RCA noted to have chronic total occlusion (). He experiences cold feet at night, particularly after sunset.    MEDICATIONS:  Current medications include aspirin, atorvastatin, Clavix, metoprolol, and nitroglycerin.    SOCIAL HISTORY:  Former smoker, quit at the time of his cardiac event.    INTEGUMENTARY:  He reports developing dark circular spots on his skin resembling blood, with one spot appearing last week and another spot noted last month.         PAST MEDICAL HISTORY:     Past Medical History:   Diagnosis Date    Coronary artery disease        PAST SURGICAL HISTORY:     Past Surgical History:   Procedure Laterality Date    CORONARY STENT PLACEMENT N/A 5/22/2024    Procedure: INSERTION, STENT, CORONARY ARTERY;  Surgeon: Srinivasan Colorado MD;  Location: [facility] CATH LAB;  " Service: Cardiology;  Laterality: N/A;  pci lad  RN PREOP 2024----NEED H/P    EYE SURGERY      IVUS, CORONARY  2024    Procedure: IVUS, Coronary;  Surgeon: Srinivasan Colorado MD;  Location: [facility] CATH LAB;  Service: Cardiology;;    IVUS, CORONARY  2024    Procedure: IVUS, Coronary;  Surgeon: Srinivasan Colorado MD;  Location: [facility] CATH LAB;  Service: Cardiology;;    LEFT HEART CATHETERIZATION Left 2024    Procedure: Left heart cath;  Surgeon: Srinivasan Colorado MD;  Location: [facility] CATH LAB;  Service: Cardiology;  Laterality: Left;    PERCUTANEOUS TRANSLUMINAL BALLOON ANGIOPLASTY OF CORONARY ARTERY  2024    Procedure: Angioplasty-coronary;  Surgeon: Srinivasan Colorado MD;  Location: [facility] CATH LAB;  Service: Cardiology;;    STENT, DRUG ELUTING, SINGLE VESSEL, CORONARY  2024    Procedure: Stent, Drug Eluting, Single Vessel, Coronary;  Surgeon: Srinivasan Colorado MD;  Location: [facility] CATH LAB;  Service: Cardiology;;           SOCIAL HISTORY:     Social History     Socioeconomic History    Marital status:    Tobacco Use    Smoking status: Former     Current packs/day: 0.00     Types: Cigarettes     Quit date: 2024     Years since quittin.0    Smokeless tobacco: Never   Substance and Sexual Activity    Alcohol use: Not Currently    Drug use: Never       FAMILY HISTORY:   No family history on file.    REVIEW OF SYSTEMS:   Review of Systems   Constitutional: Negative.   HENT: Negative.     Eyes: Negative.    Respiratory: Negative.     Endocrine: Negative.    Hematologic/Lymphatic: Negative.    Skin: Negative.    Musculoskeletal: Negative.    Gastrointestinal: Negative.    Genitourinary: Negative.    Neurological: Negative.    Psychiatric/Behavioral: Negative.     Allergic/Immunologic: Negative.        A 10 point review of systems was performed and all the pertinent positives have been mentioned. Rest of review of systems was negative.        PHYSICAL EXAM:     Vitals:    25 1315  "  BP: 116/80   Pulse: 70   Resp: 18    Body mass index is 26.51 kg/m².  Weight: 72.3 kg (159 lb 4.5 oz)   Height: 5' 5" (165.1 cm)     Physical Exam  Vitals reviewed.   Constitutional:       Appearance: He is well-developed.   HENT:      Head: Normocephalic.   Eyes:      Conjunctiva/sclera: Conjunctivae normal.      Pupils: Pupils are equal, round, and reactive to light.   Cardiovascular:      Rate and Rhythm: Normal rate and regular rhythm.      Heart sounds: Normal heart sounds.   Pulmonary:      Effort: Pulmonary effort is normal.      Breath sounds: Normal breath sounds.   Abdominal:      General: Bowel sounds are normal.      Palpations: Abdomen is soft.   Musculoskeletal:      Cervical back: Normal range of motion and neck supple.   Skin:     General: Skin is warm.   Neurological:      Mental Status: He is alert and oriented to person, place, and time.           DATA:     Laboratory:  CBC:  Recent Labs   Lab 03/22/24 1941 03/23/24 0345 05/16/24  1100   WBC 7.95 10.35 7.00   Hemoglobin 15.0 14.5 13.5 L   Hematocrit 45.5 43.1 41.0   Platelets 208 210 193       CHEMISTRIES:  Recent Labs   Lab 03/23/24 0345 03/24/24  0954 05/16/24  1100   Glucose 108 126 H 91   Sodium 139 141 143   Potassium 3.5 3.0 L 3.4 L   BUN 8 9 8   Creatinine 0.8 0.8 0.9   Calcium 8.5 L 7.9 L 8.7   Magnesium 2.2  --   --        CARDIAC BIOMARKERS:  Recent Labs   Lab 03/22/24 1941 03/22/24  2314 03/23/24  0345   Troponin I 1.471 H 5.212 H 32.935 H       COAGS:  Recent Labs   Lab 03/22/24 2054 03/23/24  0345   INR 1.0 1.0       LIPIDS/LFTS:  Recent Labs   Lab 03/22/24 1941 03/23/24  0345   Cholesterol  --  210 H   Triglycerides  --  78   HDL  --  39 L   LDL Cholesterol  --  155.4   Non-HDL Cholesterol  --  171   AST 65 H  --    ALT 23  --        Hemoglobin A1C   Date Value Ref Range Status   03/23/2024 5.4 4.0 - 5.6 % Final     Comment:     ADA Screening Guidelines:  5.7-6.4%  Consistent with prediabetes  >or=6.5%  Consistent with " diabetes    High levels of fetal hemoglobin interfere with the HbA1C  assay. Heterozygous hemoglobin variants (HbS, HgC, etc)do  not significantly interfere with this assay.   However, presence of multiple variants may affect accuracy.         TSH        The ASCVD Risk score (Kareem VERAS, et al., 2019) failed to calculate for the following reasons:    Risk score cannot be calculated because patient has a medical history suggesting prior/existing ASCVD       BNP    Lab Results   Component Value Date/Time    BNP 48 03/22/2024 07:41 PM         ASSESSMENT AND PLAN     Patient Active Problem List   Diagnosis    Smoking         ALLERGIES AND MEDICATION:   Review of patient's allergies indicates:  No Known Allergies     Medication List            Accurate as of March 21, 2025  1:39 PM. If you have any questions, ask your nurse or doctor.                START taking these medications      isosorbide mononitrate 30 MG 24 hr tablet  Commonly known as: IMDUR  Take 1 tablet (30 mg total) by mouth once daily.  Started by: Srinivasan Colorado MD            CONTINUE taking these medications      aspirin 81 MG Chew  Take 1 tablet (81 mg total) by mouth once daily.     atorvastatin 80 MG tablet  Commonly known as: LIPITOR  Take 1 tablet (80 mg total) by mouth every evening.     clopidogreL 75 mg tablet  Commonly known as: PLAVIX  Take 1 tablet (75 mg total) by mouth once daily.     metoprolol succinate 25 MG 24 hr tablet  Commonly known as: TOPROL-XL  Take 1 tablet (25 mg total) by mouth once daily.     nitroGLYCERIN 0.4 MG SL tablet  Commonly known as: NITROSTAT  Place 1 tablet (0.4 mg total) under the tongue every 5 (five) minutes as needed for Chest pain.               Where to Get Your Medications        These medications were sent to [facility]/pharmacy #[id number] - [place] - [address]  [address] [place] [address]      Phone: [phone]   aspirin 81 MG Chew  atorvastatin 80 MG tablet  clopidogreL 75 mg  tablet  isosorbide mononitrate 30 MG 24 hr tablet  metoprolol succinate 25 MG 24 hr tablet  nitroGLYCERIN 0.4 MG SL tablet         Orders Placed This Encounter   Procedures    Nuclear Stress - Cardiology Interpreted    Holter monitor - 48 hour    CV Ankle Brachial Indices (CELESTINE)    CV Abdominal Aorta    CV Ultrasound doppler arterial legs bilat    IN OFFICE EKG 12-LEAD (to [place])    Echo       Assessment & Plan    IMPRESSION:  Chest pain symptoms occur with exertion and sudden noises.  Evaluated current cardiac medication regimen.  Additional diagnostic testing needed to assess stent patency and overall cardiac function.  Plan to reassess need for Plavix after 1 year, pending stress test results.    PLAN SUMMARY:  - Continue aspirin, atorvastatin, Plavix, metoprolol, and nitroglycerin  - Start Imdur for chest pain management  - 1 year refill for all current cardiac medications  - Order abdominal ultrasound  - Order Holter monitor  - Order stress test and echocardiogram  - Order peripheral arterial ultrasound  - Order ankle-brachial index (CELESTINE) test  - Follow up after completion of ordered tests    CORONARY ARTERY DISEASE:    Coronary artery disease status post PCI of circumflex  and LAD.  Known RCA .  No more angina  Continue aspirin and Plavix.  Continue statins with goal LDL less than 70.    - Started Imdur to help manage chest pain.  - Continued aspirin, atorvastatin, Plavix, metoprolol, and nitroglycerin.  - 1 year refill for all current cardiac medications.  - Ordered stress test and echocardiogram.  - Ordered Holter monitor.    PERIPHERAL VASCULAR DISEASE:  Claudication  - Ordered peripheral arterial ultrasound.  - Ordered abdominal ultrasound.  - Ordered ankle-brachial index (CELESTINE) test.    Dyslipidemia:  Continue Lipitor.  Goal LDL is less than 55.  Recheck lipid profile    Lab Results   Component Value Date    LDLCALC 155.4 03/23/2024       FOLLOW-UP:  - Follow up after completion of ordered tests.           Thank you very much for involving me in the care of your patient.  Please do not hesitate to contact me if there are any questions.      Srinivasan Colorado MD, FACC, [facility]  Interventional Cardiologist, [facility].       Visit today included increased complexity associated with the care of the episodic problem chest pain, coronary artery disease, claudication, addressed and managing the longitudinal care of the patient due to the serious and/or complex managed problem(s) Problem List[1]  .    This note was dictated with the help of speech recognition software.  There might be un-intended errors and/or substitutions.                         [1]   Patient Active Problem List  Diagnosis    Smoking

## 2025-03-22 LAB
OHS QRS DURATION: 94 MS
OHS QTC CALCULATION: 422 MS

## 2025-04-15 ENCOUNTER — HOSPITAL ENCOUNTER (OUTPATIENT)
Dept: CARDIOLOGY | Facility: HOSPITAL | Age: 65
Discharge: HOME | End: 2025-04-15
Attending: INTERNAL MEDICINE
Payer: COMMERCIAL

## 2025-04-15 ENCOUNTER — HOSPITAL ENCOUNTER (OUTPATIENT)
Dept: RADIOLOGY | Facility: HOSPITAL | Age: 65
Discharge: HOME | End: 2025-04-15
Attending: INTERNAL MEDICINE
Payer: COMMERCIAL

## 2025-04-15 DIAGNOSIS — I25.10 CORONARY ARTERY DISEASE, UNSPECIFIED VESSEL OR LESION TYPE, UNSPECIFIED WHETHER ANGINA PRESENT, UNSPECIFIED WHETHER NATIVE OR TRANSPLANTED HEART: ICD-10-CM

## 2025-04-15 DIAGNOSIS — I73.9 CLAUDICATION: ICD-10-CM

## 2025-04-15 LAB
ABDOMINAL IMA AP: 1.77 CM
ABDOMINAL IMA PS VEL: 59 CM/S
ABDOMINAL IMA TRANS: 1.88 CM
ABDOMINAL INFRARENAL AORTA AP: 1.48 CM
ABDOMINAL INFRARENAL AORTA PS VEL: 74 CM/S
ABDOMINAL INFRARENAL AORTA TRANS: 1.48 CM
ABDOMINAL JUXTARENAL AORTA AP: 1.52 CM
ABDOMINAL JUXTARENAL AORTA PS VEL: 71 CM/S
ABDOMINAL JUXTARENAL AORTA TRANS: 1.55 CM
ABDOMINAL LT COM ILIAC AP: 0.85 CM
ABDOMINAL LT COM ILIAC TRANS: 0.84 CM
ABDOMINAL LT COM ILIAC VEL: 110 CM/S
ABDOMINAL RT COM ILIAC AP: 0.82 CM
ABDOMINAL RT COM ILIAC TRANS: 0.82 CM
ABDOMINAL RT COM ILIAC VEL: 154 CM/S
ABDOMINAL SUPRARENAL AORTA AP: 1.92 CM
ABDOMINAL SUPRARENAL AORTA PS VEL: 67 CM/S
ABDOMINAL SUPRARENAL AORTA TRANS: 1.93 CM
ASCENDING AORTA: 3.55 CM
AV INDEX (PROSTH): 0.9
AV MEAN GRADIENT: 3 MMHG
AV PEAK GRADIENT: 5 MMHG
AV VALVE AREA BY VELOCITY RATIO: 2.9 CM²
AV VALVE AREA: 2.8 CM²
AV VELOCITY RATIO: 0.91
CV ECHO LV RWT: 0.52 CM
CV STRESS BASE HR: 53 BPM
DIASTOLIC BLOOD PRESSURE: 65 MMHG
DOP CALC AO PEAK VEL: 1.1 M/S
DOP CALC AO VTI: 26.5 CM
DOP CALC LVOT AREA: 3.1 CM2
DOP CALC LVOT DIAMETER: 2 CM
DOP CALC LVOT PEAK VEL: 1 M/S
DOP CALC LVOT STROKE VOLUME: 74.7 CM3
DOP CALCLVOT PEAK VEL VTI: 23.8 CM
E WAVE DECELERATION TIME: 204 MSEC
E/A RATIO: 0.83
E/E' RATIO: 14 M/S
ECHO LV POSTERIOR WALL: 1.2 CM (ref 0.6–1.1)
FRACTIONAL SHORTENING: 30.4 % (ref 28–44)
IMMEDIATE ARM BP: 94 MMHG
IMMEDIATE LEFT ABI: 1.4
IMMEDIATE LEFT TIBIAL: 132 MMHG
IMMEDIATE RIGHT ABI: 1.36
IMMEDIATE RIGHT TIBIAL: 128 MMHG
INTERVENTRICULAR SEPTUM: 1.4 CM (ref 0.6–1.1)
IVC DIAMETER: 1.23 CM
LA MAJOR: 4.6 CM
LA MINOR: 4.6 CM
LA WIDTH: 3.1 CM
LEFT ABI: 1.12
LEFT ANT TIBIAL SYS PSV: 54 CM/S
LEFT ARM BP: 100 MMHG
LEFT ATRIUM SIZE: 4.1 CM
LEFT ATRIUM VOLUME: 50 CM3
LEFT CFA PSV: 77 CM/S
LEFT DORSALIS PEDIS: 121 MMHG
LEFT EXTERNAL ILIAC PSV: 105 CM/S
LEFT INTERNAL DIMENSION IN SYSTOLE: 3.2 CM (ref 2.1–4)
LEFT PERONEAL SYS PSV: 32 CM/S
LEFT POPLITEAL PSV: 54 CM/S
LEFT POST TIBIAL SYS PSV: 43 CM/S
LEFT POSTERIOR TIBIAL: 123 MMHG
LEFT PROFUNDA SYS PSV: 55 CM/S
LEFT SUPER FEMORAL DIST SYS PSV: 73 CM/S
LEFT SUPER FEMORAL MID SYS PSV: 77 CM/S
LEFT SUPER FEMORAL OSTIAL SYS PSV: 76 CM/S
LEFT SUPER FEMORAL PROX SYS PSV: 77 CM/S
LEFT TBI: 1.12
LEFT TIB/PER TRUNK SYS PSV: 58 CM/S
LEFT TOE PRESSURE: 123 MMHG
LEFT VENTRICLE DIASTOLIC VOLUME: 96 ML
LEFT VENTRICLE SYSTOLIC VOLUME: 41 ML
LEFT VENTRICULAR INTERNAL DIMENSION IN DIASTOLE: 4.6 CM (ref 3.5–6)
LEFT VENTRICULAR MASS: 230.2 G
LV LATERAL E/E' RATIO: 13.5 M/S
LV SEPTAL E/E' RATIO: 13.5 M/S
LVED V (TEICH): 95.84 ML
LVES V (TEICH): 40.77 ML
LVOT MG: 2.11 MMHG
LVOT MV: 0.67 CM/S
MV PEAK A VEL: 0.98 M/S
MV PEAK E VEL: 0.81 M/S
MV STENOSIS PRESSURE HALF TIME: 59.16 MS
MV VALVE AREA P 1/2 METHOD: 3.72 CM2
NUC STRESS EJECTION FRACTION: 63 %
OHS CV CPX 1 MINUTE RECOVERY HEART RATE: 88 BPM
OHS CV CPX 85 PERCENT MAX PREDICTED HEART RATE MALE: 132
OHS CV CPX ESTIMATED METS: 7
OHS CV CPX MAX PREDICTED HEART RATE: 155
OHS CV CPX PATIENT IS FEMALE: 0
OHS CV CPX PATIENT IS MALE: 1
OHS CV CPX PEAK DIASTOLIC BLOOD PRESSURE: 71 MMHG
OHS CV CPX PEAK HEAR RATE: 131 BPM
OHS CV CPX PEAK RATE PRESSURE PRODUCT: NORMAL
OHS CV CPX PEAK SYSTOLIC BLOOD PRESSURE: 156 MMHG
OHS CV CPX PERCENT MAX PREDICTED HEART RATE ACHIEVED: 85
OHS CV CPX RATE PRESSURE PRODUCT PRESENTING: 5830
OHS CV INITIAL DOSE: 10.3 MCG/KG/MIN
OHS CV LEFT COMMON ILIAC ARTERY PSV: 110 CM/S
OHS CV LEFT LOWER EXTREMITY ABI (NO CALC): 1.12
OHS CV PEAK DOSE: 31.2 MCG/KG/MIN
OHS CV RIGHT ABI LOWER EXTREMITY (NO CALC): 1.15
OHS CV RV/LV RATIO: 0.74 CM
OHS CV US RIGHT COMMON ILIAC PSV: 154 CM/S
PISA TR MAX VEL: 2.1 M/S
PV PEAK GRADIENT: 3 MMHG
PV PEAK VELOCITY: 0.8 M/S
RA MAJOR: 3.59 CM
RA PRESSURE ESTIMATED: 3 MMHG
RA WIDTH: 3.6 CM
RIGHT ABI: 1.15
RIGHT ANT TIBIAL SYS PSV: 107 CM/S
RIGHT ARM BP: 110 MMHG
RIGHT CFA PSV: 99 CM/S
RIGHT DORSALIS PEDIS: 124 MMHG
RIGHT EXTERNAL ILLIAC PSV: 95 CM/S
RIGHT PERONEAL SYS PSV: 24 CM/S
RIGHT POPLITEAL PSV: 60 CM/S
RIGHT POST TIBIAL SYS PSV: 39 CM/S
RIGHT POSTERIOR TIBIAL: 126 MMHG
RIGHT PROFUNDA SYS PSV: 81 CM/S
RIGHT SUPER FEMORAL DIST SYS PSV: 73 CM/S
RIGHT SUPER FEMORAL MID SYS PSV: 86 CM/S
RIGHT SUPER FEMORAL OSTIAL SYS PSV: 77 CM/S
RIGHT SUPER FEMORAL PROX SYS PSV: 83 CM/S
RIGHT TBI: 1.08
RIGHT TIB/PER TRUNK SYS PSV: 72 CM/S
RIGHT TOE PRESSURE: 119 MMHG
RIGHT VENTRICLE DIASTOLIC BASEL DIMENSION: 3.4 CM
RIGHT VENTRICULAR END-DIASTOLIC DIMENSION: 3.44 CM
RV TB RVSP: 5 MMHG
RV TISSUE DOPPLER FREE WALL SYSTOLIC VELOCITY 1 (APICAL 4 CHAMBER VIEW): 13.94 CM/S
SINUS: 3.64 CM
STJ: 3.58 CM
STRESS ECHO POST EXERCISE DUR MIN: 5 MINUTES
STRESS ECHO POST EXERCISE DUR SEC: 27 SECONDS
STRESS ST DEPRESSION: 1 MM
SYSTOLIC BLOOD PRESSURE: 110 MMHG
TDI LATERAL: 0.06 M/S
TDI SEPTAL: 0.06 M/S
TDI: 0.06 M/S
TOE RAISES: 75
TR MAX PG: 17 MMHG
TRICUSPID ANNULAR PLANE SYSTOLIC EXCURSION: 1.32 CM
TV REST PULMONARY ARTERY PRESSURE: 21 MMHG

## 2025-04-15 PROCEDURE — 93225 XTRNL ECG REC<48 HRS REC: CPT

## 2025-04-15 PROCEDURE — 93925 LOWER EXTREMITY STUDY: CPT | Mod: 26,,, | Performed by: INTERNAL MEDICINE

## 2025-04-15 PROCEDURE — 93978 VASCULAR STUDY: CPT

## 2025-04-15 PROCEDURE — 78452 HT MUSCLE IMAGE SPECT MULT: CPT | Mod: 26,,, | Performed by: INTERNAL MEDICINE

## 2025-04-15 PROCEDURE — 93925 LOWER EXTREMITY STUDY: CPT

## 2025-04-15 PROCEDURE — A9502 TC99M TETROFOSMIN: HCPCS | Performed by: INTERNAL MEDICINE

## 2025-04-15 PROCEDURE — 93016 CV STRESS TEST SUPVJ ONLY: CPT | Mod: ,,, | Performed by: INTERNAL MEDICINE

## 2025-04-15 PROCEDURE — 93978 VASCULAR STUDY: CPT | Mod: 26,,, | Performed by: INTERNAL MEDICINE

## 2025-04-15 PROCEDURE — 93924 LWR XTR VASC STDY BILAT: CPT | Mod: 26,,, | Performed by: INTERNAL MEDICINE

## 2025-04-15 PROCEDURE — 93924 LWR XTR VASC STDY BILAT: CPT

## 2025-04-15 PROCEDURE — 93306 TTE W/DOPPLER COMPLETE: CPT

## 2025-04-15 PROCEDURE — 93017 CV STRESS TEST TRACING ONLY: CPT

## 2025-04-15 PROCEDURE — 93306 TTE W/DOPPLER COMPLETE: CPT | Mod: 26,,, | Performed by: INTERNAL MEDICINE

## 2025-04-15 PROCEDURE — 78452 HT MUSCLE IMAGE SPECT MULT: CPT

## 2025-04-15 PROCEDURE — 93018 CV STRESS TEST I&R ONLY: CPT | Mod: ,,, | Performed by: INTERNAL MEDICINE

## 2025-04-15 RX ADMIN — TETROFOSMIN 31.2 MILLICURIE: 1.38 INJECTION, POWDER, LYOPHILIZED, FOR SOLUTION INTRAVENOUS at 08:04

## 2025-04-15 RX ADMIN — TETROFOSMIN 10.3 MILLICURIE: 1.38 INJECTION, POWDER, LYOPHILIZED, FOR SOLUTION INTRAVENOUS at 07:04

## 2025-04-28 ENCOUNTER — TELEPHONE (OUTPATIENT)
Dept: CARDIOLOGY | Facility: CLINIC | Age: 65
End: 2025-04-28
Payer: COMMERCIAL

## 2025-04-29 ENCOUNTER — TELEPHONE (OUTPATIENT)
Dept: CARDIOLOGY | Facility: CLINIC | Age: 65
End: 2025-04-29
Payer: COMMERCIAL

## 2025-05-01 ENCOUNTER — LAB VISIT (OUTPATIENT)
Dept: LAB | Facility: HOSPITAL | Age: 65
End: 2025-05-01
Attending: INTERNAL MEDICINE
Payer: COMMERCIAL

## 2025-05-01 DIAGNOSIS — I25.10 CORONARY ARTERY DISEASE INVOLVING NATIVE CORONARY ARTERY OF NATIVE HEART, UNSPECIFIED WHETHER ANGINA PRESENT: ICD-10-CM

## 2025-05-01 DIAGNOSIS — I25.10 CORONARY ARTERY DISEASE, UNSPECIFIED VESSEL OR LESION TYPE, UNSPECIFIED WHETHER ANGINA PRESENT, UNSPECIFIED WHETHER NATIVE OR TRANSPLANTED HEART: ICD-10-CM

## 2025-05-01 LAB
ALBUMIN SERPL BCP-MCNC: 3.6 G/DL (ref 3.5–5.2)
ALP SERPL-CCNC: 48 UNIT/L (ref 40–150)
ALT SERPL W/O P-5'-P-CCNC: 30 UNIT/L (ref 10–44)
AST SERPL-CCNC: 31 UNIT/L (ref 11–45)
BILIRUB DIRECT SERPL-MCNC: 0.3 MG/DL (ref 0.1–0.3)
BILIRUB SERPL-MCNC: 0.7 MG/DL (ref 0.1–1)
CHOLEST SERPL-MCNC: 141 MG/DL (ref 120–199)
CHOLEST/HDLC SERPL: 3.4 {RATIO} (ref 2–5)
HDLC SERPL-MCNC: 42 MG/DL (ref 40–75)
HDLC SERPL: 29.8 % (ref 20–50)
LDLC SERPL CALC-MCNC: 86.6 MG/DL (ref 63–159)
NONHDLC SERPL-MCNC: 99 MG/DL
PROT SERPL-MCNC: 6.8 GM/DL (ref 6–8.4)
TRIGL SERPL-MCNC: 62 MG/DL (ref 30–150)

## 2025-05-01 PROCEDURE — 80076 HEPATIC FUNCTION PANEL: CPT

## 2025-05-01 PROCEDURE — 82465 ASSAY BLD/SERUM CHOLESTEROL: CPT

## 2025-05-01 PROCEDURE — 36415 COLL VENOUS BLD VENIPUNCTURE: CPT

## 2025-05-02 ENCOUNTER — OFFICE VISIT (OUTPATIENT)
Dept: CARDIOLOGY | Facility: CLINIC | Age: 65
End: 2025-05-02
Payer: COMMERCIAL

## 2025-05-02 VITALS
HEART RATE: 69 BPM | SYSTOLIC BLOOD PRESSURE: 112 MMHG | RESPIRATION RATE: 18 BRPM | BODY MASS INDEX: 27.4 KG/M2 | HEIGHT: 65 IN | WEIGHT: 164.44 LBS | DIASTOLIC BLOOD PRESSURE: 66 MMHG | OXYGEN SATURATION: 96 %

## 2025-05-02 DIAGNOSIS — E78.5 DYSLIPIDEMIA: ICD-10-CM

## 2025-05-02 DIAGNOSIS — I25.10 CORONARY ARTERY DISEASE INVOLVING NATIVE CORONARY ARTERY OF NATIVE HEART, UNSPECIFIED WHETHER ANGINA PRESENT: ICD-10-CM

## 2025-05-02 DIAGNOSIS — I25.10 CORONARY ARTERY DISEASE, UNSPECIFIED VESSEL OR LESION TYPE, UNSPECIFIED WHETHER ANGINA PRESENT, UNSPECIFIED WHETHER NATIVE OR TRANSPLANTED HEART: Primary | ICD-10-CM

## 2025-05-02 PROCEDURE — 99999 PR PBB SHADOW E&M-EST. PATIENT-LVL IV: CPT | Mod: PBBFAC,,, | Performed by: INTERNAL MEDICINE

## 2025-05-02 RX ORDER — EZETIMIBE 10 MG/1
10 TABLET ORAL DAILY
Qty: 90 TABLET | Refills: 3 | Status: SHIPPED | OUTPATIENT
Start: 2025-05-02

## 2025-05-02 NOTE — PROGRESS NOTES
CARDIOVASCULAR CONSULTATION    REASON FOR CONSULT:   Jt Avila is a 65 y.o. male who presents for   Chief Complaint   Patient presents with    Results     HISTORY OF PRESENT ILLNESS:     Patient is a pleasant 64-year-old man.  Recently came to the hospital and underwent PCI of circumflex for myocardial infarction.  Chest pain much better now, occasionally gets chest pain on exertion.  Lifestyle limiting per patient.  Has residual  of his RCA as well as LAD disease.  I had a detailed discussion with the patient about the risks benefits of coronary artery bypass grafting with LIMA to LAD and SVG to RCA (assuming it is possible because his PDA and PLB are small diffusely diseased vessels and might not be good targets for a vein graft) versus PCI of LAD and subsequently RCA  revascularization if patient continues to have lifestyle limiting angina.  Had a detailed discussion with the patient and his family.  After detailed discussion patient stated that he does not want to go see CT surgery and is not interested in CT surgery at all and would like to proceed PCI of LAD at the current time    Results for orders placed or performed in visit on 03/21/25   IN OFFICE EKG 12-LEAD (to [facility])    Collection Time: 03/21/25  1:18 PM   Result Value Ref Range    QRS Duration 94 ms    [facility] QTC Calculation 422 ms    Narrative    Test Reason : I25.10,    Vent. Rate :  62 BPM     Atrial Rate :  62 BPM     P-R Int : 132 ms          QRS Dur :  94 ms      QT Int : 416 ms       P-R-T Axes :  45  -2  11 degrees    QTcB Int : 422 ms    Normal sinus rhythm  Incomplete right bundle branch block  Inferior infarct (cited on or before 22-Mar-2024)  Abnormal ECG  When compared with ECG of 07-Jun-2024 09:24,  T wave inversion now evident in Inferior leads  Nonspecific T wave abnormality, improved in Lateral leads  Confirmed by Daniel Tapia ([id number]) on 3/22/2025 10:59:00 AM    Referred By:            Confirmed By: Daniel Tapia           ECHO    Results for orders placed during the hospital encounter of 03/22/24    Echo    Interpretation Summary    Left Ventricle: The left ventricle is normal in size. Mildly increased wall thickness. Septal thickening. There is concentric hypertrophy. There is normal systolic function with a visually estimated ejection fraction of 55 - 60%. Grade I diastolic dysfunction.    Right Ventricle: Normal right ventricular cavity size. Systolic function is normal.    Left Atrium: Left atrium is moderately dilated.    Right Atrium: Right atrium is mildly dilated.    Mitral Valve: There is mild regurgitation.    Tricuspid Valve: There is mild regurgitation.    Pulmonary Artery: The estimated pulmonary artery systolic pressure is 25 mmHg.    IVC/SVC: Normal venous pressure at 3 mmHg.      STRESS TEST    No results found for this or any previous visit.        CATH    Results for orders placed during the hospital encounter of 03/22/24    Cardiac catheterization    Conclusion    There was three vessel coronary artery disease.    Successful IVUS guided PCI of 95% stenosis in proximal circumflex with NORMAN x1.  This was the culprit vessel for the patient's non-STEMI Prox Cx lesion: A .    The Prox Cx lesion was 95% stenosed with 0% stenosis post-intervention.    The Prox RCA lesion was 100% stenosed.    The Mid LAD lesion was 80% stenosed.    The post-procedure left ventricular end diastolic pressure was 20.    Prox Cx lesion: A STENT SYNERGY XD [id number] stent was successfully placed at 11 AURELIO for 15 sec.    The estimated blood loss was <50 mL.    Coronary angiogram:    Left main:  No significant stenosis    Lad:  Proximal 30-40% stenosis and mid 80% stenosis    Circumflex: Culprit vessel.  Proximal 95% stenosis.  Successful PCI with NORMAN    RCA:  Proximal .  Fills via bridging collaterals from proximal RCA to mid RCA as well as collaterals from septal branches of the LAD    Access: Right radial artery access    Assessment and  "plan    Continue aspirin and Plavix    Follow-up in Cardiology Clinic with Dr. Soliz to discuss PCI of LAD +/-  revascularization of RCA if needed versus coronary artery bypass grafting    Statin therapy with goal LDL less than 70                The procedure log was documented by Documenter: Sumeet Barreto RN and verified by Srinivasan Colorado MD.    Date: 3/23/2024  Time: 3:09 PM      Notes from June 20, 2024: Patient here for follow-up post PCI.  No complications from angiogram.  Doing fine.  Denies chest pains at rest on exertion, orthopnea, PND    Notes from March 25    CHIEF COMPLAINT:  Jt presents today for follow-up of chest pain    HISTORY OF PRESENT ILLNESS:  He experiences intermittent chest pain similar to his previous heart attack symptoms. The pain occurs with exertion and is associated with breathing difficulties. He describes a sensation of something "jumping" deep inside his chest lasting for a few minutes. Episodes are triggered by sudden noises and physical activity.    CARDIOVASCULAR HISTORY:  He has a history of stent placement in the circumflex and right coronary artery (RCA), with the RCA noted to have chronic total occlusion (). He experiences cold feet at night, particularly after sunset.    MEDICATIONS:  Current medications include aspirin, atorvastatin, Clavix, metoprolol, and nitroglycerin.    SOCIAL HISTORY:  Former smoker, quit at the time of his cardiac event.    INTEGUMENTARY:  He reports developing dark circular spots on his skin resembling blood, with one spot appearing last week and another spot noted last month.         Notes from May 25:    CHIEF COMPLAINT:  Jt presents today for follow up of cardiac condition    CARDIOVASCULAR:  He reports no chest pain for 2 months, including with activities such as cutting grass that previously triggered symptoms. He required one dose of sublingual nitroglycerin 1 week ago on Wednesday or Thursday. Holter monitor showed PVCs and " PACs but no arrhythmias. Stress test demonstrated a fixed defect in the RCA  area, consistent with old infarction and some aldo-infarct ischemia, but no new stenosis. Echo showed an EF of 55-60%.    VASCULAR:  Abdominal US was normal with no aneurysm detected. Leg US revealed no stenosis in the leg arteries, and ABIs were normal bilaterally.    CURRENT MEDICATIONS:  He takes aspirin, atorvastatin, clopidogrel, isosorbide mononitrate, metoprolol, and nitroglycerin as needed.    LABS:  LDL cholesterol was 86 mg/dL.    OTHER SYMPTOMS:  He reports inability to tolerate cold temperatures.    SOCIAL HISTORY:  He has quit smoking.       Results for orders placed during the hospital encounter of 04/15/25    Nuclear Stress - Cardiology Interpreted    Interpretation Summary    Abnormal myocardial perfusion scan.    There is a moderate intensity, mostly fixed perfusion abnormality with some reversibilty in the inferolateral wall.    The gated perfusion images showed an ejection fraction of 63% post stress.    The ECG portion of the study is negative for ischemia.    The patient reported no chest pain during the stress test.    During stress, occasional PVCs are noted.    The patient exercised for 5 minutes 27 seconds on a Hi protocol, achieving a peak heart rate of 131 bpm, which is 85% of the age predicted maximum heart rate.    PAST MEDICAL HISTORY:     Past Medical History:   Diagnosis Date    Coronary artery disease        PAST SURGICAL HISTORY:     Past Surgical History:   Procedure Laterality Date    CORONARY STENT PLACEMENT N/A 5/22/2024    Procedure: INSERTION, STENT, CORONARY ARTERY;  Surgeon: Srinivasan Colorado MD;  Location: [facility] CATH LAB;  Service: Cardiology;  Laterality: N/A;  pci lad  RN PREOP 5/16/2024----NEED H/P    EYE SURGERY      IVUS, CORONARY  03/23/2024    Procedure: IVUS, Coronary;  Surgeon: Srinivasan Colorado MD;  Location: [facility] CATH LAB;  Service: Cardiology;;    IVUS, CORONARY  5/22/2024     "Procedure: IVUS, Coronary;  Surgeon: Srinivasan Colorado MD;  Location: [facility] CATH LAB;  Service: Cardiology;;    LEFT HEART CATHETERIZATION Left 2024    Procedure: Left heart cath;  Surgeon: Srinivasan Colorado MD;  Location: [facility] CATH LAB;  Service: Cardiology;  Laterality: Left;    PERCUTANEOUS TRANSLUMINAL BALLOON ANGIOPLASTY OF CORONARY ARTERY  2024    Procedure: Angioplasty-coronary;  Surgeon: Srinivasan Colorado MD;  Location: [facility] CATH LAB;  Service: Cardiology;;    STENT, DRUG ELUTING, SINGLE VESSEL, CORONARY  2024    Procedure: Stent, Drug Eluting, Single Vessel, Coronary;  Surgeon: Srinivasan Colorado MD;  Location: [facility] CATH LAB;  Service: Cardiology;;           SOCIAL HISTORY:     Social History     Socioeconomic History    Marital status:    Tobacco Use    Smoking status: Former     Current packs/day: 0.00     Types: Cigarettes     Quit date: 2024     Years since quittin.1    Smokeless tobacco: Never   Substance and Sexual Activity    Alcohol use: Not Currently    Drug use: Never       FAMILY HISTORY:   No family history on file.    REVIEW OF SYSTEMS:   Review of Systems   Constitutional: Negative.   HENT: Negative.     Eyes: Negative.    Respiratory: Negative.     Endocrine: Negative.    Hematologic/Lymphatic: Negative.    Skin: Negative.    Musculoskeletal: Negative.    Gastrointestinal: Negative.    Genitourinary: Negative.    Neurological: Negative.    Psychiatric/Behavioral: Negative.     Allergic/Immunologic: Negative.        A 10 point review of systems was performed and all the pertinent positives have been mentioned. Rest of review of systems was negative.        PHYSICAL EXAM:     Vitals:    25 1402   BP: 112/66   Pulse: 69   Resp: 18    Body mass index is 27.37 kg/m².  Weight: 74.6 kg (164 lb 7.4 oz)   Height: 5' 5" (165.1 cm)     Physical Exam  Vitals reviewed.   Constitutional:       Appearance: He is well-developed.   HENT:      Head: Normocephalic.   Eyes:      " Conjunctiva/sclera: Conjunctivae normal.      Pupils: Pupils are equal, round, and reactive to light.   Cardiovascular:      Rate and Rhythm: Normal rate and regular rhythm.      Heart sounds: Normal heart sounds.   Pulmonary:      Effort: Pulmonary effort is normal.      Breath sounds: Normal breath sounds.   Abdominal:      General: Bowel sounds are normal.      Palpations: Abdomen is soft.   Musculoskeletal:      Cervical back: Normal range of motion and neck supple.   Skin:     General: Skin is warm.   Neurological:      Mental Status: He is alert and oriented to person, place, and time.           DATA:     Laboratory:  CBC:  Recent Labs   Lab 03/22/24 1941 03/23/24 0345 05/16/24  1100   WBC 7.95 10.35 7.00   Hemoglobin 15.0 14.5 13.5 L   Hematocrit 45.5 43.1 41.0   Platelets 208 210 193       CHEMISTRIES:  Recent Labs   Lab 03/23/24 0345 03/24/24  0954 05/16/24  1100   Glucose 108 126 H 91   Sodium 139 141 143   Potassium 3.5 3.0 L 3.4 L   BUN 8 9 8   Creatinine 0.8 0.8 0.9   Calcium 8.5 L 7.9 L 8.7   Magnesium 2.2  --   --        CARDIAC BIOMARKERS:  Recent Labs   Lab 03/22/24 1941 03/22/24  2314 03/23/24  0345   Troponin I 1.471 H 5.212 H 32.935 H       COAGS:  Recent Labs   Lab 03/22/24 2054 03/23/24  0345   INR 1.0 1.0       LIPIDS/LFTS:  Recent Labs   Lab 03/22/24 1941 03/23/24 0345 05/01/25  0700   Cholesterol Total  --   --  141   Cholesterol  --  210 H  --    Triglycerides  --  78  --    Triglyceride  --   --  62   HDL  --  39 L  --    HDL Cholesterol  --   --  42   LDL Cholesterol  --  155.4 86.6   Non-HDL Cholesterol  --  171  --    Non HDL Cholesterol  --   --  99   AST 65 H  --  31   ALT 23  --  30       Hemoglobin A1C   Date Value Ref Range Status   03/23/2024 5.4 4.0 - 5.6 % Final     Comment:     ADA Screening Guidelines:  5.7-6.4%  Consistent with prediabetes  >or=6.5%  Consistent with diabetes    High levels of fetal hemoglobin interfere with the HbA1C  assay. Heterozygous hemoglobin  variants (HbS, HgC, etc)do  not significantly interfere with this assay.   However, presence of multiple variants may affect accuracy.         TSH        The ASCVD Risk score (Kareem DK, et al., 2019) failed to calculate for the following reasons:    Risk score cannot be calculated because patient has a medical history suggesting prior/existing ASCVD       BNP    Lab Results   Component Value Date/Time    BNP 48 03/22/2024 07:41 PM         ASSESSMENT AND PLAN     Patient Active Problem List   Diagnosis    Smoking    Coronary artery disease    Claudication    Dyslipidemia         ALLERGIES AND MEDICATION:   Review of patient's allergies indicates:  No Known Allergies     Medication List            Accurate as of May 2, 2025  2:39 PM. If you have any questions, ask your nurse or doctor.                CONTINUE taking these medications      aspirin 81 MG Chew  Take 1 tablet (81 mg total) by mouth once daily.     atorvastatin 80 MG tablet  Commonly known as: LIPITOR  Take 1 tablet (80 mg total) by mouth every evening.     clopidogreL 75 mg tablet  Commonly known as: PLAVIX  Take 1 tablet (75 mg total) by mouth once daily.     isosorbide mononitrate 30 MG 24 hr tablet  Commonly known as: IMDUR  Take 1 tablet (30 mg total) by mouth once daily.     metoprolol succinate 25 MG 24 hr tablet  Commonly known as: TOPROL-XL  Take 1 tablet (25 mg total) by mouth once daily.     nitroGLYCERIN 0.4 MG SL tablet  Commonly known as: NITROSTAT  Place 1 tablet (0.4 mg total) under the tongue every 5 (five) minutes as needed for Chest pain.              No orders of the defined types were placed in this encounter.      IMPRESSION:  Reviewed recent test results.  Inferior defect on stress test is due to RCA , mostly infarction with some aldo-infarct ischemia.  No need for repeat angiogram based on test results and symptom improvement.  Started ezetimibe (Zetia) in addition to current atorvastatin due to elevated LDL cholesterol at  86.    PLAN SUMMARY:  - Continue clopidogrel (Plavix)  - Continue metoprolol  - Continue isosorbide mononitrate (Imdur)  - Continue aspirin  - Continue atorvastatin  - Continue nitroglycerin as needed  - Jt advised to avoid smoking  - Lipid panel ordered for 6 months  - Follow-up in 6 months after lipid panel results    CORONARY ARTERY DISEASE:  - Continued aspirin.  - Continued atorvastatin.  - Continued clopidogrel (Plavix).  - Continued isosorbide mononitrate (Imdur).  - Continued metoprolol.  - Continued nitroglycerin as needed.  - Lipid panel ordered in 6 months.    HISTORY OF NICOTINE DEPENDENCE:  - Jt to continue avoiding smoking.    FOLLOW-UP:  - Follow up in 6 months after getting lipids done.       CORONARY ARTERY DISEASE:    Coronary artery disease status post PCI of circumflex  and LAD.  Known RCA .  No more angina  Continue aspirin and Plavix.  Continue statins with goal LDL less than 70.        PERIPHERAL VASCULAR DISEASE screening  No significant peripheral vascular disease and normal ABIs on recent testing in 2025        Lab Results   Component Value Date    LDLCALC 86.6 05/01/2025                 Thank you very much for involving me in the care of your patient.  Please do not hesitate to contact me if there are any questions.      Srinivasan Colorado MD, FACC, [facility]  Interventional Cardiologist, [facility].       Visit today included increased complexity associated with the care of the episodic problem chest pain, coronary artery disease, claudication, addressed and managing the longitudinal care of the patient due to the serious and/or complex managed problem(s) Problem List[1]  .    This note was dictated with the help of speech recognition software.  There might be un-intended errors and/or substitutions.                           [1]   Patient Active Problem List  Diagnosis    Smoking    Coronary artery disease    Claudication    Dyslipidemia

## (undated) DEVICE — PAD DEFIB CADENCE ADULT R2

## (undated) DEVICE — KIT SYR REUSABLE

## (undated) DEVICE — ANGIOTOUCH KIT

## (undated) DEVICE — GUIDE LAUNCHER 6FR JL 3.5

## (undated) DEVICE — GUIDEWIRE RUNTHROUGH EF 180CM

## (undated) DEVICE — GUIDE LAUNCHER 6FR EBU 3.5

## (undated) DEVICE — WIRE GUIDE SAFE-T-J .035 260CM

## (undated) DEVICE — KIT ESSENTIALS W/ Y ADAPTER

## (undated) DEVICE — KIT MANIFOLD LOW PRESS TUBING

## (undated) DEVICE — CATH NC EMERGE MR 3X15MM

## (undated) DEVICE — CATH EMPULSE ANGLED 5FR PIGTAI

## (undated) DEVICE — CATH EMERGE MR 12 X 3.00

## (undated) DEVICE — CATH DXTERITY JR40 100CM 5FR

## (undated) DEVICE — VALVE CONTROL COPILOT

## (undated) DEVICE — CATH DXTERITY JL35 100CM 5FR

## (undated) DEVICE — KIT GLIDESHEATH SLEND 6FR 10CM

## (undated) DEVICE — PAD RADI FEMORAL

## (undated) DEVICE — PACK CATH LAB

## (undated) DEVICE — OMNIPAQUE CONTRAST 350MG/100ML

## (undated) DEVICE — HEMOSTAT VASC BAND REG 24CM

## (undated) DEVICE — CATH EMERGE MR 15 X 3.00

## (undated) DEVICE — CATH EAGLE EYE PLATINUM

## (undated) DEVICE — OMNIPAQUE CONTRAST 300MG/100ML

## (undated) DEVICE — CATH NC EMERGE MR 3.25X15MM

## (undated) DEVICE — CATH GUIDE LINER  V3 6F

## (undated) DEVICE — PRESTO INFLATION DEVICE

## (undated) DEVICE — KIT PROBE COVER WITH GEL